# Patient Record
Sex: FEMALE | Race: WHITE | NOT HISPANIC OR LATINO
[De-identification: names, ages, dates, MRNs, and addresses within clinical notes are randomized per-mention and may not be internally consistent; named-entity substitution may affect disease eponyms.]

---

## 2017-08-14 PROBLEM — Z00.00 ENCOUNTER FOR PREVENTIVE HEALTH EXAMINATION: Status: ACTIVE | Noted: 2017-08-14

## 2017-08-15 ENCOUNTER — APPOINTMENT (OUTPATIENT)
Dept: INTERNAL MEDICINE | Facility: CLINIC | Age: 57
End: 2017-08-15
Payer: COMMERCIAL

## 2017-08-15 ENCOUNTER — NON-APPOINTMENT (OUTPATIENT)
Age: 57
End: 2017-08-15

## 2017-08-15 VITALS
HEIGHT: 67 IN | HEART RATE: 93 BPM | DIASTOLIC BLOOD PRESSURE: 89 MMHG | WEIGHT: 170 LBS | TEMPERATURE: 97.8 F | RESPIRATION RATE: 17 BRPM | SYSTOLIC BLOOD PRESSURE: 136 MMHG | BODY MASS INDEX: 26.68 KG/M2 | OXYGEN SATURATION: 98 %

## 2017-08-15 DIAGNOSIS — Z86.69 PERSONAL HISTORY OF OTHER DISEASES OF THE NERVOUS SYSTEM AND SENSE ORGANS: ICD-10-CM

## 2017-08-15 DIAGNOSIS — Z82.0 FAMILY HISTORY OF EPILEPSY AND OTHER DISEASES OF THE NERVOUS SYSTEM: ICD-10-CM

## 2017-08-15 DIAGNOSIS — I10 ESSENTIAL (PRIMARY) HYPERTENSION: ICD-10-CM

## 2017-08-15 DIAGNOSIS — Z87.898 PERSONAL HISTORY OF OTHER SPECIFIED CONDITIONS: ICD-10-CM

## 2017-08-15 DIAGNOSIS — Z82.49 FAMILY HISTORY OF ISCHEMIC HEART DISEASE AND OTHER DISEASES OF THE CIRCULATORY SYSTEM: ICD-10-CM

## 2017-08-15 DIAGNOSIS — N60.09 SOLITARY CYST OF UNSPECIFIED BREAST: ICD-10-CM

## 2017-08-15 DIAGNOSIS — Z78.9 OTHER SPECIFIED HEALTH STATUS: ICD-10-CM

## 2017-08-15 DIAGNOSIS — Z80.51 FAMILY HISTORY OF MALIGNANT NEOPLASM OF KIDNEY: ICD-10-CM

## 2017-08-15 DIAGNOSIS — Z86.59 PERSONAL HISTORY OF OTHER MENTAL AND BEHAVIORAL DISORDERS: ICD-10-CM

## 2017-08-15 DIAGNOSIS — R00.2 PALPITATIONS: ICD-10-CM

## 2017-08-15 DIAGNOSIS — Z80.1 FAMILY HISTORY OF MALIGNANT NEOPLASM OF TRACHEA, BRONCHUS AND LUNG: ICD-10-CM

## 2017-08-15 PROCEDURE — 99204 OFFICE O/P NEW MOD 45 MIN: CPT | Mod: 25

## 2017-08-15 PROCEDURE — 93000 ELECTROCARDIOGRAM COMPLETE: CPT

## 2017-08-15 RX ORDER — TRAZODONE HYDROCHLORIDE 50 MG/1
50 TABLET ORAL
Qty: 60 | Refills: 0 | Status: ACTIVE | COMMUNITY
Start: 2017-02-28

## 2017-08-15 RX ORDER — DULOXETINE HYDROCHLORIDE 60 MG/1
60 CAPSULE, DELAYED RELEASE PELLETS ORAL
Qty: 60 | Refills: 0 | Status: COMPLETED | COMMUNITY
Start: 2017-04-12

## 2017-08-15 RX ORDER — ESZOPICLONE 1 MG/1
1 TABLET, FILM COATED ORAL
Qty: 30 | Refills: 0 | Status: COMPLETED | COMMUNITY
Start: 2017-06-01

## 2017-08-16 LAB
ALBUMIN SERPL ELPH-MCNC: 4.7 G/DL
ALP BLD-CCNC: 96 U/L
ALT SERPL-CCNC: 36 U/L
ANION GAP SERPL CALC-SCNC: 17 MMOL/L
AST SERPL-CCNC: 34 U/L
BASOPHILS # BLD AUTO: 0.03 K/UL
BASOPHILS NFR BLD AUTO: 0.3 %
BILIRUB SERPL-MCNC: 0.4 MG/DL
BUN SERPL-MCNC: 14 MG/DL
CALCIUM SERPL-MCNC: 9.6 MG/DL
CHLORIDE SERPL-SCNC: 98 MMOL/L
CHOLEST SERPL-MCNC: 201 MG/DL
CHOLEST/HDLC SERPL: 3.2 RATIO
CO2 SERPL-SCNC: 26 MMOL/L
CREAT SERPL-MCNC: 0.83 MG/DL
EOSINOPHIL # BLD AUTO: 0.48 K/UL
EOSINOPHIL NFR BLD AUTO: 5.2 %
GLUCOSE SERPL-MCNC: 87 MG/DL
HBA1C MFR BLD HPLC: 5.9 %
HCT VFR BLD CALC: 42.7 %
HDLC SERPL-MCNC: 62 MG/DL
HGB BLD-MCNC: 13.7 G/DL
IMM GRANULOCYTES NFR BLD AUTO: 0.4 %
LDLC SERPL CALC-MCNC: 101 MG/DL
LYMPHOCYTES # BLD AUTO: 2.62 K/UL
LYMPHOCYTES NFR BLD AUTO: 28.5 %
MAN DIFF?: NORMAL
MCHC RBC-ENTMCNC: 32.1 GM/DL
MCHC RBC-ENTMCNC: 32.9 PG
MCV RBC AUTO: 102.4 FL
MONOCYTES # BLD AUTO: 0.63 K/UL
MONOCYTES NFR BLD AUTO: 6.9 %
NEUTROPHILS # BLD AUTO: 5.38 K/UL
NEUTROPHILS NFR BLD AUTO: 58.7 %
PLATELET # BLD AUTO: 347 K/UL
POTASSIUM SERPL-SCNC: 4.6 MMOL/L
PROT SERPL-MCNC: 7.5 G/DL
RBC # BLD: 4.17 M/UL
RBC # FLD: 13.5 %
SODIUM SERPL-SCNC: 141 MMOL/L
T4 FREE SERPL-MCNC: 1.1 NG/DL
TRIGL SERPL-MCNC: 191 MG/DL
TSH SERPL-ACNC: 2.89 UIU/ML
WBC # FLD AUTO: 9.18 K/UL

## 2017-09-27 ENCOUNTER — APPOINTMENT (OUTPATIENT)
Dept: CARDIOLOGY | Facility: CLINIC | Age: 57
End: 2017-09-27

## 2021-11-08 ENCOUNTER — APPOINTMENT (OUTPATIENT)
Dept: UROLOGY | Facility: CLINIC | Age: 61
End: 2021-11-08
Payer: COMMERCIAL

## 2021-11-08 VITALS
OXYGEN SATURATION: 96 % | WEIGHT: 170 LBS | BODY MASS INDEX: 26.37 KG/M2 | DIASTOLIC BLOOD PRESSURE: 82 MMHG | HEART RATE: 88 BPM | HEIGHT: 67.5 IN | TEMPERATURE: 97.3 F | RESPIRATION RATE: 17 BRPM | SYSTOLIC BLOOD PRESSURE: 128 MMHG

## 2021-11-08 DIAGNOSIS — R39.15 URGENCY OF URINATION: ICD-10-CM

## 2021-11-08 DIAGNOSIS — R35.1 NOCTURIA: ICD-10-CM

## 2021-11-08 DIAGNOSIS — R35.0 FREQUENCY OF MICTURITION: ICD-10-CM

## 2021-11-08 DIAGNOSIS — Z86.59 PERSONAL HISTORY OF OTHER MENTAL AND BEHAVIORAL DISORDERS: ICD-10-CM

## 2021-11-08 DIAGNOSIS — Z86.79 PERSONAL HISTORY OF OTHER DISEASES OF THE CIRCULATORY SYSTEM: ICD-10-CM

## 2021-11-08 DIAGNOSIS — R10.9 UNSPECIFIED ABDOMINAL PAIN: ICD-10-CM

## 2021-11-08 PROCEDURE — 99204 OFFICE O/P NEW MOD 45 MIN: CPT | Mod: 25

## 2021-11-08 PROCEDURE — 51798 US URINE CAPACITY MEASURE: CPT

## 2021-11-08 NOTE — HISTORY OF PRESENT ILLNESS
[FreeTextEntry1] : seen by PCP and labs done that showed elevated potassium and with patient hx of flank pain and fam hx of renal disease was told to see Urology\par no hematuria\par no tobacco use or exposure\par no renal stone hx or fam hx of the same\par + MOHAN after  ( vag) with use of liners\par not sexually active\par admits to  ' a lot of water intake' and tomato products ( I'm Iital[place]) but denies any other spicy foods or fluids \par no bowel issues\par here for further eval :

## 2021-11-08 NOTE — PHYSICAL EXAM
[General Appearance - Well Developed] : well developed [General Appearance - Well Nourished] : well nourished [Normal Appearance] : normal appearance [Well Groomed] : well groomed [General Appearance - In No Acute Distress] : no acute distress [Edema] : no peripheral edema [Respiration, Rhythm And Depth] : normal respiratory rhythm and effort [Exaggerated Use Of Accessory Muscles For Inspiration] : no accessory muscle use [Abdomen Soft] : soft [Abdomen Tenderness] : non-tender [Abdomen Mass (___ Cm)] : no abdominal mass palpated [Abdomen Hernia] : no hernia was discovered [Costovertebral Angle Tenderness] : no ~M costovertebral angle tenderness [FreeTextEntry1] : pvr 44 ml  [Normal Station and Gait] : the gait and station were normal for the patient's age [] : no rash [No Focal Deficits] : no focal deficits [Oriented To Time, Place, And Person] : oriented to person, place, and time [Affect] : the affect was normal [Mood] : the mood was normal [Not Anxious] : not anxious [Cervical Lymph Nodes Enlarged Posterior Bilaterally] : posterior cervical [Cervical Lymph Nodes Enlarged Anterior Bilaterally] : anterior cervical [Supraclavicular Lymph Nodes Enlarged Bilaterally] : supraclavicular

## 2021-11-08 NOTE — ASSESSMENT
[FreeTextEntry1] : patien with many years of frequency,nocturia and urgency and INC\par seen by PCP and labs done that showed elevated potassium and with patient hx of flank pain and fam hx of renal disease was told to see Urology\par no hematuria\par no tobacco use or exposure\par no renal stone hx or fam hx of the same\par + MOHAN after  ( vag) with use of liners\par not sexually active\par admits to  ' a lot of water intake' and tomato products ( I'm [place]) but denies any other spicy foods or fluids \par no bowel issues\par here for further eval :\par recommend\par 1) chec urine\par 2) trial of meds for ? OAB sxs : \par 3) discussed first line of therapy with behaviro modification and list of acidic foods to decrease given\par 4) discussed if 3rd  line of therapy with BOtox or  PTNS\par for now she agrees to meds: vesicare 10 mg daily \par risks and benefits discussed\par will get screening LYDIA due to flank pain and concern for renal disease --but stressed to see NEPHROLOGY for elevated potassium\par rtc 4-6 weeks to see if meds helping \par amparo call with urine results

## 2021-11-08 NOTE — REVIEW OF SYSTEMS
[Shortness Of Breath] : shortness of breath [Wheezing] : wheezing [Heartburn] : heartburn [Date of last menstrual period ____] : date of last menstrual period: [unfilled] [Wake up at night to urinate  How many times?  ___] : wakes up to urinate [unfilled] times during the night [Itching] : itching [Anxiety] : anxiety [Depression] : depression [Negative] : Heme/Lymph [Feeling Tired] : feeling tired [see HPI] : see HPI [Urine Infection (bladder/kidney)] : bladder/kidney infection [Pain during urination] : pain during urination [Leakage of urine with straining, coughing, laughing] : leakage of urine with straining, coughing, laughing [FreeTextEntry2] : HBP [FreeTextEntry6] : day frequency - 10

## 2021-11-10 LAB
APPEARANCE: ABNORMAL
BACTERIA UR CULT: NORMAL
BACTERIA: NEGATIVE
BILIRUBIN URINE: NEGATIVE
BLOOD URINE: NEGATIVE
CALCIUM OXALATE CRYSTALS: ABNORMAL
COLOR: YELLOW
GLUCOSE QUALITATIVE U: NEGATIVE
HYALINE CASTS: 0 /LPF
KETONES URINE: NEGATIVE
LEUKOCYTE ESTERASE URINE: ABNORMAL
MICROSCOPIC-UA: NORMAL
NITRITE URINE: NEGATIVE
PH URINE: 6
PROTEIN URINE: NORMAL
RED BLOOD CELLS URINE: 2 /HPF
SPECIFIC GRAVITY URINE: 1.02
SQUAMOUS EPITHELIAL CELLS: 7 /HPF
UROBILINOGEN URINE: NORMAL
WHITE BLOOD CELLS URINE: 21 /HPF

## 2021-11-15 ENCOUNTER — APPOINTMENT (OUTPATIENT)
Dept: ULTRASOUND IMAGING | Facility: CLINIC | Age: 61
End: 2021-11-15
Payer: COMMERCIAL

## 2021-11-15 PROCEDURE — 76775 US EXAM ABDO BACK WALL LIM: CPT

## 2021-12-06 ENCOUNTER — APPOINTMENT (OUTPATIENT)
Dept: UROLOGY | Facility: CLINIC | Age: 61
End: 2021-12-06

## 2022-03-11 ENCOUNTER — APPOINTMENT (OUTPATIENT)
Dept: PULMONOLOGY | Facility: CLINIC | Age: 62
End: 2022-03-11

## 2022-03-16 ENCOUNTER — RX RENEWAL (OUTPATIENT)
Age: 62
End: 2022-03-16

## 2022-03-26 ENCOUNTER — TRANSCRIPTION ENCOUNTER (OUTPATIENT)
Age: 62
End: 2022-03-26

## 2022-04-06 ENCOUNTER — TRANSCRIPTION ENCOUNTER (OUTPATIENT)
Age: 62
End: 2022-04-06

## 2023-02-04 ENCOUNTER — NON-APPOINTMENT (OUTPATIENT)
Age: 63
End: 2023-02-04

## 2023-06-14 ENCOUNTER — INPATIENT (INPATIENT)
Facility: HOSPITAL | Age: 63
LOS: 0 days | Discharge: HOME | DRG: 378 | End: 2023-06-15
Attending: HOSPITALIST | Admitting: STUDENT IN AN ORGANIZED HEALTH CARE EDUCATION/TRAINING PROGRAM
Payer: COMMERCIAL

## 2023-06-14 VITALS
WEIGHT: 166.01 LBS | OXYGEN SATURATION: 100 % | RESPIRATION RATE: 20 BRPM | DIASTOLIC BLOOD PRESSURE: 81 MMHG | SYSTOLIC BLOOD PRESSURE: 128 MMHG | HEIGHT: 68 IN | TEMPERATURE: 99 F | HEART RATE: 93 BPM

## 2023-06-14 DIAGNOSIS — I10 ESSENTIAL (PRIMARY) HYPERTENSION: ICD-10-CM

## 2023-06-14 DIAGNOSIS — K92.2 GASTROINTESTINAL HEMORRHAGE, UNSPECIFIED: ICD-10-CM

## 2023-06-14 DIAGNOSIS — Z29.9 ENCOUNTER FOR PROPHYLACTIC MEASURES, UNSPECIFIED: ICD-10-CM

## 2023-06-14 DIAGNOSIS — D64.9 ANEMIA, UNSPECIFIED: ICD-10-CM

## 2023-06-14 DIAGNOSIS — Z90.49 ACQUIRED ABSENCE OF OTHER SPECIFIED PARTS OF DIGESTIVE TRACT: Chronic | ICD-10-CM

## 2023-06-14 DIAGNOSIS — F41.9 ANXIETY DISORDER, UNSPECIFIED: ICD-10-CM

## 2023-06-14 LAB
ALBUMIN SERPL ELPH-MCNC: 4.4 G/DL — SIGNIFICANT CHANGE UP (ref 3.3–5)
ALP SERPL-CCNC: 69 U/L — SIGNIFICANT CHANGE UP (ref 40–120)
ALT FLD-CCNC: 21 U/L — SIGNIFICANT CHANGE UP (ref 10–45)
ANION GAP SERPL CALC-SCNC: 11 MMOL/L — SIGNIFICANT CHANGE UP (ref 5–17)
ANISOCYTOSIS BLD QL: SIGNIFICANT CHANGE UP
APTT BLD: 28 SEC — SIGNIFICANT CHANGE UP (ref 27.5–35.5)
AST SERPL-CCNC: 28 U/L — SIGNIFICANT CHANGE UP (ref 10–40)
BASOPHILS # BLD AUTO: 0 K/UL — SIGNIFICANT CHANGE UP (ref 0–0.2)
BASOPHILS NFR BLD AUTO: 0 % — SIGNIFICANT CHANGE UP (ref 0–2)
BILIRUB SERPL-MCNC: 0.2 MG/DL — SIGNIFICANT CHANGE UP (ref 0.2–1.2)
BUN SERPL-MCNC: 17 MG/DL — SIGNIFICANT CHANGE UP (ref 7–23)
CALCIUM SERPL-MCNC: 9.2 MG/DL — SIGNIFICANT CHANGE UP (ref 8.4–10.5)
CHLORIDE SERPL-SCNC: 102 MMOL/L — SIGNIFICANT CHANGE UP (ref 96–108)
CO2 SERPL-SCNC: 25 MMOL/L — SIGNIFICANT CHANGE UP (ref 22–31)
CREAT SERPL-MCNC: 0.73 MG/DL — SIGNIFICANT CHANGE UP (ref 0.5–1.3)
EGFR: 91 ML/MIN/1.73M2 — SIGNIFICANT CHANGE UP
EOSINOPHIL # BLD AUTO: 0.21 K/UL — SIGNIFICANT CHANGE UP (ref 0–0.5)
EOSINOPHIL NFR BLD AUTO: 1.7 % — SIGNIFICANT CHANGE UP (ref 0–6)
GLUCOSE SERPL-MCNC: 116 MG/DL — HIGH (ref 70–99)
HCT VFR BLD CALC: 19.5 % — CRITICAL LOW (ref 34.5–45)
HGB BLD-MCNC: 6.4 G/DL — CRITICAL LOW (ref 11.5–15.5)
INR BLD: 0.96 RATIO — SIGNIFICANT CHANGE UP (ref 0.88–1.16)
LYMPHOCYTES # BLD AUTO: 2.56 K/UL — SIGNIFICANT CHANGE UP (ref 1–3.3)
LYMPHOCYTES # BLD AUTO: 21.2 % — SIGNIFICANT CHANGE UP (ref 13–44)
MACROCYTES BLD QL: SLIGHT — SIGNIFICANT CHANGE UP
MANUAL SMEAR VERIFICATION: SIGNIFICANT CHANGE UP
MCHC RBC-ENTMCNC: 32.8 GM/DL — SIGNIFICANT CHANGE UP (ref 32–36)
MCHC RBC-ENTMCNC: 35 PG — HIGH (ref 27–34)
MCV RBC AUTO: 106.6 FL — HIGH (ref 80–100)
MONOCYTES # BLD AUTO: 0.92 K/UL — HIGH (ref 0–0.9)
MONOCYTES NFR BLD AUTO: 7.6 % — SIGNIFICANT CHANGE UP (ref 2–14)
MYELOCYTES NFR BLD: 1.7 % — HIGH (ref 0–0)
NEUTROPHILS # BLD AUTO: 8.18 K/UL — HIGH (ref 1.8–7.4)
NEUTROPHILS NFR BLD AUTO: 67.8 % — SIGNIFICANT CHANGE UP (ref 43–77)
NRBC # BLD: 1 /100 — HIGH (ref 0–0)
OB PNL STL: POSITIVE
PLAT MORPH BLD: NORMAL — SIGNIFICANT CHANGE UP
PLATELET # BLD AUTO: 352 K/UL — SIGNIFICANT CHANGE UP (ref 150–400)
POLYCHROMASIA BLD QL SMEAR: SLIGHT — SIGNIFICANT CHANGE UP
POTASSIUM SERPL-MCNC: 3.3 MMOL/L — LOW (ref 3.5–5.3)
POTASSIUM SERPL-SCNC: 3.3 MMOL/L — LOW (ref 3.5–5.3)
PROT SERPL-MCNC: 6.5 G/DL — SIGNIFICANT CHANGE UP (ref 6–8.3)
PROTHROM AB SERPL-ACNC: 11 SEC — SIGNIFICANT CHANGE UP (ref 10.5–13.4)
RBC # BLD: 1.83 M/UL — LOW (ref 3.8–5.2)
RBC # FLD: 14.1 % — SIGNIFICANT CHANGE UP (ref 10.3–14.5)
RBC BLD AUTO: ABNORMAL
SODIUM SERPL-SCNC: 138 MMOL/L — SIGNIFICANT CHANGE UP (ref 135–145)
WBC # BLD: 12.07 K/UL — HIGH (ref 3.8–10.5)
WBC # FLD AUTO: 12.07 K/UL — HIGH (ref 3.8–10.5)

## 2023-06-14 PROCEDURE — 71045 X-RAY EXAM CHEST 1 VIEW: CPT | Mod: 26

## 2023-06-14 PROCEDURE — 99223 1ST HOSP IP/OBS HIGH 75: CPT | Mod: GC

## 2023-06-14 PROCEDURE — 99285 EMERGENCY DEPT VISIT HI MDM: CPT | Mod: FS

## 2023-06-14 RX ORDER — FUROSEMIDE 40 MG
40 TABLET ORAL EVERY 12 HOURS
Refills: 0 | Status: DISCONTINUED | OUTPATIENT
Start: 2023-06-15 | End: 2023-06-15

## 2023-06-14 RX ORDER — POTASSIUM CHLORIDE 20 MEQ
40 PACKET (EA) ORAL ONCE
Refills: 0 | Status: COMPLETED | OUTPATIENT
Start: 2023-06-14 | End: 2023-06-14

## 2023-06-14 RX ORDER — ACETAMINOPHEN 500 MG
650 TABLET ORAL EVERY 6 HOURS
Refills: 0 | Status: DISCONTINUED | OUTPATIENT
Start: 2023-06-14 | End: 2023-06-15

## 2023-06-14 RX ORDER — PANTOPRAZOLE SODIUM 20 MG/1
80 TABLET, DELAYED RELEASE ORAL ONCE
Refills: 0 | Status: COMPLETED | OUTPATIENT
Start: 2023-06-14 | End: 2023-06-14

## 2023-06-14 RX ORDER — TRAZODONE HCL 50 MG
50 TABLET ORAL AT BEDTIME
Refills: 0 | Status: DISCONTINUED | OUTPATIENT
Start: 2023-06-14 | End: 2023-06-15

## 2023-06-14 RX ORDER — DULOXETINE HYDROCHLORIDE 30 MG/1
60 CAPSULE, DELAYED RELEASE ORAL DAILY
Refills: 0 | Status: DISCONTINUED | OUTPATIENT
Start: 2023-06-14 | End: 2023-06-15

## 2023-06-14 RX ADMIN — PANTOPRAZOLE SODIUM 80 MILLIGRAM(S): 20 TABLET, DELAYED RELEASE ORAL at 21:01

## 2023-06-14 RX ADMIN — Medication 40 MILLIEQUIVALENT(S): at 22:56

## 2023-06-14 NOTE — ED ADULT NURSE NOTE - NSFALLUNIVINTERV_ED_ALL_ED
Bed/Stretcher in lowest position, wheels locked, appropriate side rails in place/Call bell, personal items and telephone in reach/Instruct patient to call for assistance before getting out of bed/chair/stretcher/Non-slip footwear applied when patient is off stretcher/[place] to call system/Physically safe environment - no spills, clutter or unnecessary equipment/Purposeful proactive rounding/Room/bathroom lighting operational, light cord in reach

## 2023-06-14 NOTE — H&P ADULT - NSHPLABSRESULTS_GEN_ALL_CORE
(06-14 @ 19:47)                      6.4  12.07 )-----------( 352                 19.5    Neutrophils = 8.18 (67.8%)  Lymphocytes = 2.56 (21.2%)  Eosinophils = 0.21 (1.7%)  Basophils = 0.00 (0.0%)  Monocytes = 0.92 (7.6%)  Bands = --%    06-14    138  |  102  |  17  ----------------------------<  116<H>  3.3<L>   |  25  |  0.73    Ca    9.2      14 Jun 2023 19:47    TPro  6.5  /  Alb  4.4  /  TBili  0.2  /  DBili  x   /  AST  28  /  ALT  21  /  AlkPhos  69  06-14    ( 14 Jun 2023 19:47 )   PT: 11.0 sec;   INR: 0.96 ratio;       PTT:28.0 sec      RVP:          Tox: (06-14 @ 19:47)                      6.4  12.07 )-----------( 352                 19.5    Neutrophils = 8.18 (67.8%)  Lymphocytes = 2.56 (21.2%)  Eosinophils = 0.21 (1.7%)  Basophils = 0.00 (0.0%)  Monocytes = 0.92 (7.6%)  Bands = --%    06-14    138  |  102  |  17  ----------------------------<  116<H>  3.3<L>   |  25  |  0.73    Ca    9.2      14 Jun 2023 19:47    TPro  6.5  /  Alb  4.4  /  TBili  0.2  /  DBili  x   /  AST  28  /  ALT  21  /  AlkPhos  69  06-14    ( 14 Jun 2023 19:47 )   PT: 11.0 sec;   INR: 0.96 ratio;    PTT:28.0 sec    RVP:    Tox:    I have reviewed the labs, imaging and ekg.

## 2023-06-14 NOTE — ED ADULT NURSE NOTE - OBJECTIVE STATEMENT
63year old female pt presented to the ED stating low H/H, pt was dizzy and lightheaded dismissed symptoms due to recent medication change, followed up with Cardiologist who did US/Echo and bloodwork, received a call for low h/h, pt denies any anticoagulant use states 1 dark stook on sat, denies hematuria, pt denies any chest pain no shortness of breath pt is ambulatory A&Ox3 abd soft non tender non distended lung field cta

## 2023-06-14 NOTE — H&P ADULT - TIME BILLING
Need to interview and examine patient, discuss case with house staff and RNs, provide counseling, and review prior medical records

## 2023-06-14 NOTE — ED ADULT NURSE REASSESSMENT NOTE - NS ED NURSE REASSESS COMMENT FT1
Received report from KRISTINA Caputo RN. Patient presenting to the ED following low hemglobin of 6.4 at MD office. Patient notes no dizziness while at rest. Patient reports feeling lightheaded with ambulation. Assisted to restroom. AOx4 and speaking coherently. Steady gait. Breathing is unlabored, spontaneous, and symmetrical. Comfort and safety measures maintained.

## 2023-06-14 NOTE — ED PROVIDER NOTE - PHYSICAL EXAMINATION
GEN: Pt non-toxic in NAD, alert.  PSYCH: Affect and mood appropriate.  EYES: Sclera pale w/o injection, EOMI, PERRLA.   ENT: Neck supple. Airway patent.  RESP: CTA b/l, no wheezes, rales, or rhonchi.   CARDIAC: RRR, clear distinct S1, S2, no appreciable murmurs.  ABD: Soft, non-tender. No CVAT b/l.  MSK: ROBERT. FROM throughout.  NEURO: No focal motor or sensory deficits.  VASC: Strong distal pulses. No edema. No calf tenderness.  SKIN: No rashes or lesions.

## 2023-06-14 NOTE — H&P ADULT - PROBLEM SELECTOR PLAN 1
likely 2/2 UGIB. On admission, Hg 6.5. Pt with dizziness, palpitations, aspirin use > 20 years    - active type and screen  - transfuse if Hg <7  - 1u prbc in ED  - c/w Protonix 40 IVP BID likely 2/2 UGIB. On admission, Hg 6.5. Pt with dizziness, palpitations, aspirin use > 20 years    - active type and screen  - transfuse if Hg <7  - 1u prbc in ED  - c/w Protonix 40 IVP BID  - GI emailed  - iron labs, folate, B12 pending likely 2/2 UGIB. On admission, Hg 6.5. Pt with dizziness, palpitations, aspirin use > 20 years    - active type and screen  - transfuse if Hg <7  - 1u prbc in ED  - c/w Protonix 40 IVP BID  - GI emailed  - iron labs, folate, B12 pending  -Trend cbc  -Clear liquid diet

## 2023-06-14 NOTE — H&P ADULT - PROBLEM SELECTOR PLAN 2
- currently off meds; previously on lisinopril which was switched amlodipine however developed LE swelling     - monitor BP - currently off meds; previously on lisinopril which was switched amlodipine however developed LE swelling   - monitor BP

## 2023-06-14 NOTE — H&P ADULT - NSHPPHYSICALEXAM_GEN_ALL_CORE
Vital Signs Last 24 Hrs  T(C): 36.6 (14 Jun 2023 21:45), Max: 37 (14 Jun 2023 15:11)  T(F): 97.8 (14 Jun 2023 21:45), Max: 98.6 (14 Jun 2023 15:11)  HR: 83 (14 Jun 2023 21:45) (69 - 95)  BP: 118/74 (14 Jun 2023 21:45) (118/74 - 134/82)  BP(mean): --  RR: 18 (14 Jun 2023 21:45) (16 - 20)  SpO2: 98% (14 Jun 2023 21:45) (96% - 100%)    Parameters below as of 14 Jun 2023 21:45  Patient On (Oxygen Delivery Method): room air        CONSTITUTIONAL: Well-groomed, in no apparent distress  EYES: No conjunctival or scleral injection, non-icteric; PERRLA and symmetric  ENMT: No external nasal lesions; oral mucosa with moist membranes  NECK: Trachea midline  RESPIRATORY: Breathing comfortably; lungs CTA without wheeze/rhonchi/rales  CARDIOVASCULAR: +S1S2, RRR, no M/G/R;; no lower extremity edema  GASTROINTESTINAL: nondistended, tender to palpation in epigastric region; bowel sounds present; no rebound/guarding   MUSCULOSKELETAL: normal strength and tone of extremities  SKIN: No rashes or ulcers noted  NEUROLOGIC: no focal deficits noted   PSYCHIATRIC: A+O x 3; mood and affect appropriate; appropriate insight and judgment

## 2023-06-14 NOTE — ED PROVIDER NOTE - NS ED ATTENDING STATEMENT MOD
This was a shared visit with the JONA. I reviewed and verified the documentation and independently performed the documented:

## 2023-06-14 NOTE — H&P ADULT - ASSESSMENT
63F with HTN, asthma, migraines p/w abnormal outpatient labs from cardiologists office.  63F with HTN, asthma, migraines p/w abnormal outpatient labs from cardiologists office. Admitted for anemia likely 2/2 UGIB.

## 2023-06-14 NOTE — H&P ADULT - ATTENDING COMMENTS
63F with HTN, asthma, migraines p/w abnormal outpatient labs from cardiologists office. Admitted for anemia likely 2/2 UGIB. Placed on zio patch recently for pt's SOB. Also with hypokalemia  -Clear liquid diet  -PPI IV BID  -Transfuse for hgb <7  -GI consult in AM  -Cont. home psych meds  -Replete electrolyte K<4, Mg <2  -DVT PPX, SCDs

## 2023-06-14 NOTE — ED PROVIDER NOTE - CLINICAL SUMMARY MEDICAL DECISION MAKING FREE TEXT BOX
Tyler Boss MD, FACEP: In this physician's medical judgement based on clinical history and physical exam the patient's signs and symptoms lead to differential diagnoses which includes but is not limited to: symptomatic anemia, Upper GIB    Historical features, symptoms, and clinical exam not consistent with: lower gib    Labs were ordered and independently reviewed by me.  EKG was ordered and independently reviewed by me.  Imaging was ordered and reviewed by me.      Appropriate medications for the patient's presenting complaints were ordered, and effects were reassessed.   Escalation to admission/observation was considered.    Will follow up on labs, therapeutics, imaging, reassess and disposition as clinically indicated.  *The above represents an initial assessment/impression. Please refer to my progress notes below for potential changes in patient clinical course*

## 2023-06-14 NOTE — H&P ADULT - PROBLEM SELECTOR PLAN 3
Home meds: Trazadone QHS, cymbalta 60 daily     - c/w home meds Home meds: Trazadone QHS, cymbalta 60 daily   - c/w home meds

## 2023-06-14 NOTE — ED PROVIDER NOTE - OBJECTIVE STATEMENT
63-year-old female past medical history of hypertension presents to ED accompanied by  and referred by cardiologist for anemia hemoglobin 7.1 on outpatient labs done 2 days ago.  Patient reports she was having dizziness and palpitations on exertion over the weekend.  Saw her cardiologist on Monday had labs done and echo performed yesterday with Holter monitor placed.  Not on AC.  Reports 1 dark bowel movement over the weekend but none since.  No history of GI bleeds, no heavy vaginal bleeding, no hematuria.  Denies fever, chills, chest pain, shortness of breath, abdominal pain.

## 2023-06-14 NOTE — ED PROVIDER NOTE - PROGRESS NOTE DETAILS
Lupillo Josue PA-C: Hemoglobin 6.4, fecal occult positive.  Consent for blood signed.  Will transfuse and admit.

## 2023-06-14 NOTE — H&P ADULT - HISTORY OF PRESENT ILLNESS
63F with HTN, asthma, migraines p/w abnormal outpatient labs from cardiologists office.  2019 novel coronavirus disease (COVID-19) Aspiration pneumonitis 63F with HTN, asthma, migraines p/w abnormal outpatient labs from cardiologists office. Hgb noted to be 7.1 (baseline ~13 on HIE). Pt reports dizziness and palpitations over the weekend. Her HR at that time 110- 130s with stable BP. She had gone to the cardiologist (Dr. Tyler Metz) on Monday who had obtained an EKG and blood work. TTE was completed yesterday per patient. States she had one episode of melena on Saturday, after which she has not had any further bowel movements. Today, denies dizziness. Reports palpitations and fatigue. Denies N/V/F/C, chest pain, SOB, dysuria. Reports feeling of fullness/ distension and mild epigastric pain. She has never been on AC. Reports taking 2 tablets of Excedrin ~3x/ week for 25 years. Denies increased alochol use, spicy foods, and coffee intake. Pt denies vaginal bleeding or history of fibroids. Menopause 20 years ago.     In the ED- HR 60- 90, - 130, room air  Labs- Hgb 6.4, WBC 12.07, Plt 352, K 3.3, Cr 0.73, fecal occult +  CXR clear lungs  Meds- protonix 80, 1u PRBC

## 2023-06-15 ENCOUNTER — TRANSCRIPTION ENCOUNTER (OUTPATIENT)
Age: 63
End: 2023-06-15

## 2023-06-15 ENCOUNTER — RESULT REVIEW (OUTPATIENT)
Age: 63
End: 2023-06-15

## 2023-06-15 VITALS
RESPIRATION RATE: 20 BRPM | HEART RATE: 100 BPM | OXYGEN SATURATION: 100 % | DIASTOLIC BLOOD PRESSURE: 66 MMHG | SYSTOLIC BLOOD PRESSURE: 122 MMHG

## 2023-06-15 LAB
ALBUMIN SERPL ELPH-MCNC: 4 G/DL — SIGNIFICANT CHANGE UP (ref 3.3–5)
ALP SERPL-CCNC: 64 U/L — SIGNIFICANT CHANGE UP (ref 40–120)
ALT FLD-CCNC: 25 U/L — SIGNIFICANT CHANGE UP (ref 10–45)
ANION GAP SERPL CALC-SCNC: 10 MMOL/L — SIGNIFICANT CHANGE UP (ref 5–17)
APTT BLD: 26.4 SEC — LOW (ref 27.5–35.5)
AST SERPL-CCNC: 31 U/L — SIGNIFICANT CHANGE UP (ref 10–40)
BASOPHILS # BLD AUTO: 0.05 K/UL — SIGNIFICANT CHANGE UP (ref 0–0.2)
BASOPHILS NFR BLD AUTO: 0.5 % — SIGNIFICANT CHANGE UP (ref 0–2)
BILIRUB SERPL-MCNC: 0.3 MG/DL — SIGNIFICANT CHANGE UP (ref 0.2–1.2)
BUN SERPL-MCNC: 11 MG/DL — SIGNIFICANT CHANGE UP (ref 7–23)
CALCIUM SERPL-MCNC: 9.1 MG/DL — SIGNIFICANT CHANGE UP (ref 8.4–10.5)
CHLORIDE SERPL-SCNC: 104 MMOL/L — SIGNIFICANT CHANGE UP (ref 96–108)
CO2 SERPL-SCNC: 26 MMOL/L — SIGNIFICANT CHANGE UP (ref 22–31)
CREAT SERPL-MCNC: 0.71 MG/DL — SIGNIFICANT CHANGE UP (ref 0.5–1.3)
EGFR: 94 ML/MIN/1.73M2 — SIGNIFICANT CHANGE UP
EOSINOPHIL # BLD AUTO: 0.34 K/UL — SIGNIFICANT CHANGE UP (ref 0–0.5)
EOSINOPHIL NFR BLD AUTO: 3.4 % — SIGNIFICANT CHANGE UP (ref 0–6)
FERRITIN SERPL-MCNC: 69 NG/ML — SIGNIFICANT CHANGE UP (ref 15–150)
FOLATE SERPL-MCNC: >20 NG/ML — SIGNIFICANT CHANGE UP
GLUCOSE SERPL-MCNC: 103 MG/DL — HIGH (ref 70–99)
HCT VFR BLD CALC: 23.4 % — LOW (ref 34.5–45)
HCT VFR BLD CALC: 25 % — LOW (ref 34.5–45)
HGB BLD-MCNC: 7.5 G/DL — LOW (ref 11.5–15.5)
HGB BLD-MCNC: 7.9 G/DL — LOW (ref 11.5–15.5)
IMM GRANULOCYTES NFR BLD AUTO: 1.2 % — HIGH (ref 0–0.9)
INR BLD: 0.93 RATIO — SIGNIFICANT CHANGE UP (ref 0.88–1.16)
IRON SATN MFR SERPL: 12 % — LOW (ref 14–50)
IRON SATN MFR SERPL: 46 UG/DL — SIGNIFICANT CHANGE UP (ref 30–160)
LYMPHOCYTES # BLD AUTO: 2.56 K/UL — SIGNIFICANT CHANGE UP (ref 1–3.3)
LYMPHOCYTES # BLD AUTO: 25.8 % — SIGNIFICANT CHANGE UP (ref 13–44)
MAGNESIUM SERPL-MCNC: 2.3 MG/DL — SIGNIFICANT CHANGE UP (ref 1.6–2.6)
MCHC RBC-ENTMCNC: 31.6 GM/DL — LOW (ref 32–36)
MCHC RBC-ENTMCNC: 31.6 PG — SIGNIFICANT CHANGE UP (ref 27–34)
MCHC RBC-ENTMCNC: 32.1 GM/DL — SIGNIFICANT CHANGE UP (ref 32–36)
MCHC RBC-ENTMCNC: 32.3 PG — SIGNIFICANT CHANGE UP (ref 27–34)
MCV RBC AUTO: 100 FL — SIGNIFICANT CHANGE UP (ref 80–100)
MCV RBC AUTO: 100.9 FL — HIGH (ref 80–100)
MONOCYTES # BLD AUTO: 0.71 K/UL — SIGNIFICANT CHANGE UP (ref 0–0.9)
MONOCYTES NFR BLD AUTO: 7.2 % — SIGNIFICANT CHANGE UP (ref 2–14)
NEUTROPHILS # BLD AUTO: 6.14 K/UL — SIGNIFICANT CHANGE UP (ref 1.8–7.4)
NEUTROPHILS NFR BLD AUTO: 61.9 % — SIGNIFICANT CHANGE UP (ref 43–77)
NRBC # BLD: 0 /100 WBCS — SIGNIFICANT CHANGE UP (ref 0–0)
NRBC # BLD: 0 /100 WBCS — SIGNIFICANT CHANGE UP (ref 0–0)
PHOSPHATE SERPL-MCNC: 3.4 MG/DL — SIGNIFICANT CHANGE UP (ref 2.5–4.5)
PLATELET # BLD AUTO: 335 K/UL — SIGNIFICANT CHANGE UP (ref 150–400)
PLATELET # BLD AUTO: 340 K/UL — SIGNIFICANT CHANGE UP (ref 150–400)
POTASSIUM SERPL-MCNC: 3.9 MMOL/L — SIGNIFICANT CHANGE UP (ref 3.5–5.3)
POTASSIUM SERPL-SCNC: 3.9 MMOL/L — SIGNIFICANT CHANGE UP (ref 3.5–5.3)
PROT SERPL-MCNC: 6.1 G/DL — SIGNIFICANT CHANGE UP (ref 6–8.3)
PROTHROM AB SERPL-ACNC: 10.8 SEC — SIGNIFICANT CHANGE UP (ref 10.5–13.4)
RBC # BLD: 2.32 M/UL — LOW (ref 3.8–5.2)
RBC # BLD: 2.5 M/UL — LOW (ref 3.8–5.2)
RBC # FLD: 19.1 % — HIGH (ref 10.3–14.5)
RBC # FLD: 19.6 % — HIGH (ref 10.3–14.5)
SODIUM SERPL-SCNC: 140 MMOL/L — SIGNIFICANT CHANGE UP (ref 135–145)
TIBC SERPL-MCNC: 381 UG/DL — SIGNIFICANT CHANGE UP (ref 220–430)
TRANSFERRIN SERPL-MCNC: 284 MG/DL — SIGNIFICANT CHANGE UP (ref 200–360)
UIBC SERPL-MCNC: 335 UG/DL — SIGNIFICANT CHANGE UP (ref 110–370)
VIT B12 SERPL-MCNC: 341 PG/ML — SIGNIFICANT CHANGE UP (ref 232–1245)
WBC # BLD: 9.73 K/UL — SIGNIFICANT CHANGE UP (ref 3.8–10.5)
WBC # BLD: 9.92 K/UL — SIGNIFICANT CHANGE UP (ref 3.8–10.5)
WBC # FLD AUTO: 9.73 K/UL — SIGNIFICANT CHANGE UP (ref 3.8–10.5)
WBC # FLD AUTO: 9.92 K/UL — SIGNIFICANT CHANGE UP (ref 3.8–10.5)

## 2023-06-15 PROCEDURE — P9016: CPT

## 2023-06-15 PROCEDURE — 83735 ASSAY OF MAGNESIUM: CPT

## 2023-06-15 PROCEDURE — 82272 OCCULT BLD FECES 1-3 TESTS: CPT

## 2023-06-15 PROCEDURE — 85027 COMPLETE CBC AUTOMATED: CPT

## 2023-06-15 PROCEDURE — 82607 VITAMIN B-12: CPT

## 2023-06-15 PROCEDURE — 80053 COMPREHEN METABOLIC PANEL: CPT

## 2023-06-15 PROCEDURE — 86850 RBC ANTIBODY SCREEN: CPT

## 2023-06-15 PROCEDURE — 83550 IRON BINDING TEST: CPT

## 2023-06-15 PROCEDURE — 36415 COLL VENOUS BLD VENIPUNCTURE: CPT

## 2023-06-15 PROCEDURE — 85610 PROTHROMBIN TIME: CPT

## 2023-06-15 PROCEDURE — 86923 COMPATIBILITY TEST ELECTRIC: CPT

## 2023-06-15 PROCEDURE — 88305 TISSUE EXAM BY PATHOLOGIST: CPT

## 2023-06-15 PROCEDURE — 85730 THROMBOPLASTIN TIME PARTIAL: CPT

## 2023-06-15 PROCEDURE — 43239 EGD BIOPSY SINGLE/MULTIPLE: CPT | Mod: GC

## 2023-06-15 PROCEDURE — 82746 ASSAY OF FOLIC ACID SERUM: CPT

## 2023-06-15 PROCEDURE — 85025 COMPLETE CBC W/AUTO DIFF WBC: CPT

## 2023-06-15 PROCEDURE — 43239 EGD BIOPSY SINGLE/MULTIPLE: CPT

## 2023-06-15 PROCEDURE — 99285 EMERGENCY DEPT VISIT HI MDM: CPT | Mod: 25

## 2023-06-15 PROCEDURE — 84100 ASSAY OF PHOSPHORUS: CPT

## 2023-06-15 PROCEDURE — 88305 TISSUE EXAM BY PATHOLOGIST: CPT | Mod: 26

## 2023-06-15 PROCEDURE — 84466 ASSAY OF TRANSFERRIN: CPT

## 2023-06-15 PROCEDURE — 99239 HOSP IP/OBS DSCHRG MGMT >30: CPT

## 2023-06-15 PROCEDURE — 83540 ASSAY OF IRON: CPT

## 2023-06-15 PROCEDURE — 86901 BLOOD TYPING SEROLOGIC RH(D): CPT

## 2023-06-15 PROCEDURE — 99222 1ST HOSP IP/OBS MODERATE 55: CPT | Mod: GC,25

## 2023-06-15 PROCEDURE — 71045 X-RAY EXAM CHEST 1 VIEW: CPT

## 2023-06-15 PROCEDURE — 82728 ASSAY OF FERRITIN: CPT

## 2023-06-15 PROCEDURE — 36430 TRANSFUSION BLD/BLD COMPNT: CPT

## 2023-06-15 PROCEDURE — 86900 BLOOD TYPING SEROLOGIC ABO: CPT

## 2023-06-15 PROCEDURE — 96374 THER/PROPH/DIAG INJ IV PUSH: CPT

## 2023-06-15 RX ORDER — PANTOPRAZOLE SODIUM 20 MG/1
40 TABLET, DELAYED RELEASE ORAL EVERY 12 HOURS
Refills: 0 | Status: DISCONTINUED | OUTPATIENT
Start: 2023-06-15 | End: 2023-06-15

## 2023-06-15 RX ORDER — CHLORHEXIDINE GLUCONATE 213 G/1000ML
1 SOLUTION TOPICAL DAILY
Refills: 0 | Status: DISCONTINUED | OUTPATIENT
Start: 2023-06-15 | End: 2023-06-15

## 2023-06-15 RX ORDER — DIPHENHYDRAMINE HCL 50 MG
12.5 CAPSULE ORAL ONCE
Refills: 0 | Status: DISCONTINUED | OUTPATIENT
Start: 2023-06-15 | End: 2023-06-15

## 2023-06-15 RX ORDER — PANTOPRAZOLE SODIUM 20 MG/1
1 TABLET, DELAYED RELEASE ORAL
Qty: 30 | Refills: 0
Start: 2023-06-15 | End: 2023-07-14

## 2023-06-15 RX ORDER — PANTOPRAZOLE SODIUM 20 MG/1
40 TABLET, DELAYED RELEASE ORAL
Refills: 0 | Status: DISCONTINUED | OUTPATIENT
Start: 2023-06-15 | End: 2023-06-15

## 2023-06-15 RX ORDER — DIPHENHYDRAMINE HCL 50 MG
25 CAPSULE ORAL ONCE
Refills: 0 | Status: COMPLETED | OUTPATIENT
Start: 2023-06-15 | End: 2023-06-15

## 2023-06-15 RX ADMIN — Medication 25 MILLIGRAM(S): at 06:17

## 2023-06-15 RX ADMIN — DULOXETINE HYDROCHLORIDE 60 MILLIGRAM(S): 30 CAPSULE, DELAYED RELEASE ORAL at 16:53

## 2023-06-15 NOTE — PROGRESS NOTE ADULT - ASSESSMENT
63F with HTN, asthma, migraines p/w abnormal outpatient labs from cardiologists office. Admitted for anemia likely 2/2 UGIB.

## 2023-06-15 NOTE — PRE PROCEDURE NOTE - PRE PROCEDURE EVALUATION
Attending Physician:  Dr Sue                          Procedure: EGD    Indication for Procedure: Melena   ________________________________________________________  PAST MEDICAL & SURGICAL HISTORY:  Asthma      Migraine      HTN (hypertension)      History of appendectomy        ALLERGIES:  Allergy Status Unknown    HOME MEDICATIONS:  Albuterol (Eqv-Proventil HFA) 90 mcg/inh inhalation aerosol: 2 puff(s) inhaled once a day as needed for  shortness of breath and/or wheezing  DULoxetine 60 mg oral delayed release capsule: 1 tab(s) orally once a day  traZODone 50 mg oral tablet: 1 tab(s) orally once a day (at bedtime)    AICD/PPM: [ ] yes   [x ] no    PERTINENT LAB DATA:                        7.5    9.92  )-----------( 340      ( 15 Sree 2023 06:55 )             23.4     06-15    140  |  104  |  11  ----------------------------<  103<H>  3.9   |  26  |  0.71    Ca    9.1      15 Sree 2023 06:49  Phos  3.4     06-15  Mg     2.3     06-15    TPro  6.1  /  Alb  4.0  /  TBili  0.3  /  DBili  x   /  AST  31  /  ALT  25  /  AlkPhos  64  06-15    PT/INR - ( 15 Sree 2023 06:55 )   PT: 10.8 sec;   INR: 0.93 ratio         PTT - ( 15 Sree 2023 06:55 )  PTT:26.4 sec            PHYSICAL EXAMINATION:    Height (cm): 170.2  Weight (kg): 75.8  BMI (kg/m2): 26.2  BSA (m2): 1.87T(C): 36.8  HR: 92  BP: 128/72  RR: 16  SpO2: 96%    Constitutional: NAD    Neck:  No JVD  Respiratory: no respiratory distress   Cardiovascular: RRR  Extremities: No peripheral edema  Neurological: A/O x 3, no focal deficits        COMMENTS:    The patient is a suitable candidate for the planned procedure unless box checked [ ]  No, explain:

## 2023-06-15 NOTE — DISCHARGE NOTE PROVIDER - NSDCMRMEDTOKEN_GEN_ALL_CORE_FT
Albuterol (Eqv-Proventil HFA) 90 mcg/inh inhalation aerosol: 2 puff(s) inhaled once a day as needed for  shortness of breath and/or wheezing  DULoxetine 60 mg oral delayed release capsule: 1 tab(s) orally once a day  pantoprazole 40 mg oral delayed release tablet: 1 tab(s) orally once a day (before a meal)  traZODone 50 mg oral tablet: 1 tab(s) orally once a day (at bedtime)

## 2023-06-15 NOTE — PATIENT PROFILE ADULT - LEGAL HELP
[facility] DIVISION of INFECTIOUS DISEASE  Arturo Olivas MD PhD, Haylee Umanzor MD, Andressa Brantley MD, Billy Valenzuela MD  and providing coverage with Alexa Canas MD and Eusebio Chairez MD  Providing Infectious Disease Consultations at [facility], [facility], [facility]    Office# [phone] to schedule follow up appointments  Answering Service for urgent calls or New Consults [phone]  Cell# to text for urgent issues Arturo Olivas [phone]     infectious diseases progress note:    BREA BECKHAM is a 75y y. o. Female patient    Patient remains in [facility]    Allergies    codeine (Hives)    Intolerances        ANTIBIOTICS/RELEVANT:  antimicrobials  fluconAZOLE   Tablet 100 milliGRAM(s) Oral daily  piperacillin/tazobactam IVPB.. 3.375 Gram(s) IV Intermittent every 12 hours    immunologic:    OTHER:  acetaminophen   Tablet .. 650 milliGRAM(s) Oral every 6 hours PRN  albuterol/ipratropium for Nebulization 3 milliLiter(s) Nebulizer every 6 hours  aspirin  chewable 81 milliGRAM(s) Enteral Tube daily  bisacodyl Suppository 10 milliGRAM(s) Rectal at bedtime  chlorhexidine 0.12% Liquid 15 milliLiter(s) Oral Mucosa two times a day  chlorhexidine 2% Cloths 1 Application(s) Topical daily  dextrose 40% Gel 15 Gram(s) Oral once  dextrose 5%. 1000 milliLiter(s) IV Continuous <Continuous>  dextrose 5%. 1000 milliLiter(s) IV Continuous <Continuous>  dextrose 50% Injectable 25 Gram(s) IV Push once  dextrose 50% Injectable 12.5 Gram(s) IV Push once  dextrose 50% Injectable 25 Gram(s) IV Push once  fentaNYL   Patch  12 MICROgram(s)/Hr 1 Patch Transdermal every 72 hours  glucagon  Injectable 1 milliGRAM(s) IntraMuscular once  insulin glargine Injectable (LANTUS) 36 Unit(s) SubCutaneous at bedtime  insulin lispro (ADMELOG) corrective regimen sliding scale   SubCutaneous every 6 hours  lactated ringers. 1000 milliLiter(s) IV Continuous <Continuous>  lactobacillus acidophilus 2 Tablet(s) Oral daily  LORazepam   Injectable 0.5 milliGRAM(s) IV Push three times a day  norepinephrine Infusion 0.05 MICROgram(s)/kG/Min IV Continuous <Continuous>  pantoprazole  Injectable 40 milliGRAM(s) IV Push two times a day  polyethylene glycol 3350 17 Gram(s) Oral daily  senna 2 Tablet(s) Oral at bedtime  simethicone 80 milliGRAM(s) Chew every 6 hours  sodium chloride 0.9% lock flush 10 milliLiter(s) IV Push every 1 hour PRN  sucralfate suspension 1 Gram(s) Oral four times a day      Objective:  Last 24-Vital Signs Last 24 Hrs  T(C): 37.1 (10 Sep 2021 08:08), Max: 37.7 (09 Sep 2021 12:00)  T(F): 98.8 (10 Sep 2021 08:08), Max: 99.9 (09 Sep 2021 12:00)  HR: 65 (10 Sep 2021 09:00) (65 - 135)  BP: 91/48 (10 Sep 2021 09:00) (91/48 - 196/48)  BP(mean): 61 (10 Sep 2021 09:00) (61 - 90)  RR: 14 (10 Sep 2021 09:00) (11 - 50)  SpO2: 100% (10 Sep 2021 09:00) (97% - 100%)    T(C): 37.1 (09-10-21 @ 08:08), Max: 38.1 (09-09-21 @ 00:00)  T(F): 98.8 (09-10-21 @ 08:08), Max: 100.6 (09-09-21 @ 00:00)  T(C): 37.1 (09-10-21 @ 08:08), Max: 38.4 (09-07-21 @ 09:55)  T(F): 98.8 (09-10-21 @ 08:08), Max: 101.1 (09-07-21 @ 09:55)  T(C): 37.1 (09-10-21 @ 08:08), Max: 38.4 (09-07-21 @ 09:55)  T(F): 98.8 (09-10-21 @ 08:08), Max: 101.1 (09-07-21 @ 09:55)    PHYSICAL EXAM:  Constitutional: Well-developed, well nourished  Eyes: PERRLA, EOMI  Ear/Nose/Throat: oropharynx normal	  Neck: no JVD, no lymphadenopathy, supple  Respiratory: no accessory muscle use, lung fields bilaterally clear  Cardiovascular: distant  Gastrointestinal: soft, NT, no HSM, BS-normal, feeding tube  Extremities: no clubbing, no cyanosis, left foot great toe with swelling, erythema, dark areas  Neuro: patient not alert, nonverbal    Mode: AC/ CMV (Assist Control/ Continuous Mandatory Ventilation), RR (machine): 14, TV (machine): 400, FiO2: 30, PEEP: 5, ITime: 1, MAP: 11, PIP: 31    LABS:                        7.0    5.50  )-----------( 206      ( 10 Sep 2021 08:17 )             24.1       WBC 5.50  09-10 @ 08:17  WBC 7.69  09-09 @ 15:06  WBC 4.84  09-09 @ 07:04  WBC 7.65  09-08 @ 09:03  WBC 8.44  09-08 @ 06:55  WBC 26.45  09-07 @ 10:38      09-10    140  |  109<H>  |  19  ----------------------------<  185<H>  4.3   |  24  |  1.08    Ca    8.4      10 Sep 2021 08:17  Phos  2.0     09-09  Mg     1.7     09-10    TPro  6.0  /  Alb  2.0<L>  /  TBili  0.4  /  DBili  x   /  AST  39  /  ALT  36  /  AlkPhos  72  09-10      Creatinine, Serum: 1.08 mg/dL (09-10-21 @ 08:17)  Creatinine, Serum: 1.24 mg/dL (09-09-21 @ 07:04)  Creatinine, Serum: 1.61 mg/dL (09-08-21 @ 06:55)  Creatinine, Serum: 2.58 mg/dL (09-07-21 @ 10:56)      PT/INR - ( 09 Sep 2021 07:04 )   PT: 12.4 sec;   INR: 1.03 ratio                   MICROBIOLOGY:              RADIOLOGY & ADDITIONAL STUDIES:   no

## 2023-06-15 NOTE — DISCHARGE NOTE PROVIDER - NSDCCPTREATMENT_GEN_ALL_CORE_FT
PRINCIPAL PROCEDURE  Procedure: Upper endoscopy  Findings and Treatment: Impression:          - Widely patent Schatzki ring.                       - Non-bleeding gastric ulcer with a clean ulcer base (William Class III).                        This is likely source of melena.                       - Normal duodenal bulb, first portion of the duodenum and second portion of                        the duodenum.                       - Biopsies were taken with a cold forceps for histology in the gastric body                    and in the gastric antrum.  Recommendation:      - Resume regular diet today.                       - Return patient to hospital [facility] for ongoing care.                       - Use a proton pump inhibitor PO daily for 4 weeks.                - Await pathology results.                       -No high risk stigmata found. OK for DC home        SECONDARY PROCEDURE  Procedure: XR chest AP  Findings and Treatment: TECHNIQUE: Single frontal, portable view of thechest was obtained.  COMPARISON: None  FINDINGS:  The heart is normal in size. Right-sided or duplicated aortic arch, a   normal variant.  Pleural/parenchymal calcifications of the right and left upper chest and   along the lung apices.  The lungs are otherwise clear.  There is no pneumothorax or pleural effusion.  IMPRESSION:  Bilateral pleural calcifications and possible aortic variation which can   be further evaluated with CT of the chest if prior plain films are not   available for comparison.

## 2023-06-15 NOTE — PROGRESS NOTE ADULT - ATTENDING COMMENTS
This is a 63F with HTN, asthma, migraines p/w low Hb in outpatient labs from cardiologists office as she was tachycardic, feeling weak, noted some black stool. Has been using NSAIDs for arthritis, LBP.  Found to have Hb 6.4, s/p 1 PRBC transfusion.    1. Acute UGI bleed with blood loss anemia  2. HTN  3. Migraine  4. Chronic LBP    - Feels lot better, Hb 7.5 from 6.4 after 1 unit PRBC, BP is stable, on PPI  - GI evaluated and did EGD-> one  with no active bleed, biopsy taken  - will repeat H/H, if stable will d/c her home today as GI cleared on PO PPI for 4 weeks  - Outpatient GI f/u  - advised not to use NSAIDs  - dc time 37 min

## 2023-06-15 NOTE — DISCHARGE NOTE PROVIDER - CARE PROVIDER_API CALL
Dr. Pardeep  Phone: (   )    -  Fax: (   )    -  Follow Up Time:     Eliseo Bolton  Gastroenterology  [address] [place] [address], [facility] [[email or web address] [place] [address]  Phone: [phone]  Fax: [phone]  Established Patient  Follow Up Time:

## 2023-06-15 NOTE — DISCHARGE NOTE PROVIDER - NSDCFUADDAPPT_GEN_ALL_CORE_FT
Please follow up with your primary care provider and Gastroenterology. Please call to schedule an appointment to review your results from your biopsy and to discuss further work up of incidental chest x-ray findings.

## 2023-06-15 NOTE — DISCHARGE NOTE PROVIDER - PROVIDER TOKENS
FREE:[LAST:[Anwar],FIRST:[],PHONE:[(   )    -],FAX:[(   )    -]],PROVIDER:[TOKEN:[[id number]],ESTABLISHEDPATIENT:[T]]

## 2023-06-15 NOTE — PATIENT PROFILE ADULT - NSPRESCRALCSCORE_GEN_A_NUR_CAL
butalbital-APAP-caffeine, hydrALAZINE, benzonatate    Data:     Past Medical History:   has a past medical history of Acute cystitis without hematuria, Arthritis, Chronic daily headache, GERD (gastroesophageal reflux disease), Hyperlipidemia, Hypertension, Moderate malnutrition ([address] Multiple sclerosis ([facility] [address] Neuropathy, Pneumonia, and Vitamin D deficiency. Social History:   reports that she has never smoked. She has never used smokeless tobacco. She reports that she does not drink alcohol or use drugs. Family History: History reviewed. No pertinent family history. Vitals:  BP (!) 108/55   Pulse 101   Temp 98.1 °F (36.7 °C) (Oral)   Resp 16   Ht 4' 11\" (1.499 m)   Wt 132 lb 15 oz (60.3 kg)   SpO2 93%   BMI 26.85 kg/m²   Temp (24hrs), Av.5 °F (36.9 °C), Min:98.1 °F (36.7 °C), Max:98.9 °F (37.2 °C)    No results for input(s): POCGLU in the last 72 hours. I/O(24Hr): Intake/Output Summary (Last 24 hours) at 2021 0724  Last data filed at 2021 0600  Gross per 24 hour   Intake 1400 ml   Output 100 ml   Net 1300 ml       Labs:    [unfilled]    Lab Results   Component Value Date/Time    SPECIAL NOT REPORTED 10/06/2019 04:19 PM     Lab Results   Component Value Date/Time    CULTURE NO GROWTH 10/06/2019 04:19 PM       [unfilled]    Radiology:    Ct Head Wo Contrast    Result Date: 2021  EXAMINATION: CT OF THE HEAD WITHOUT CONTRAST  2021 4:32 pm TECHNIQUE: CT of the head was performed without the administration of intravenous contrast. Dose modulation, iterative reconstruction, and/or weight based adjustment of the mA/kV was utilized to reduce the radiation dose to as low as reasonably achievable. COMPARISON: MRI of the brain 2020.  HISTORY: ORDERING SYSTEM PROVIDED HISTORY: dizziness, vomiting TECHNOLOGIST PROVIDED HISTORY: dizziness, vomiting Decision Support Exception->Emergency Medical Condition (MA) Reason for Exam: Dizzines, vomiting Acuity: Unknown Type of Exam: Unknown Mechanism of Injury: Pt c/o worsening double vision and dizziness, h/o multiple sclerosis FINDINGS: BRAIN/VENTRICLES: There is no acute intracranial hemorrhage, mass effect or midline shift. No abnormal extra-axial fluid collection. The gray-white differentiation is maintained without evidence of an acute infarct. There is no evidence of hydrocephalus. The ventricular system is mildly prominent, but still well within limits of normal for size for the age of the patient and with compensatory prominence of sulcation. There are areas of decreased attenuation density in the deep white matter and periventricular regions compatible with areas of old micro ischemic change. There is some prominence of the cerebellar and vermian sulci. Calcifications are present in vessels at the base of the brain. ORBITS: The visualized portion of the orbits demonstrate no acute abnormality. SINUSES: The visualized paranasal sinuses and mastoid air cells demonstrate no acute abnormality. SOFT TISSUES/SKULL:  No acute abnormality of the visualized skull or soft tissues. Senescent changes compatible with the age of the patient. No acute intracranial abnormality. Xr Chest Portable    Result Date: 2/25/2021  EXAMINATION: ONE XRAY VIEW OF THE CHEST 2/25/2021 3:20 pm COMPARISON: 10/09/2019. HISTORY: ORDERING SYSTEM PROVIDED HISTORY: cough, dizziness TECHNOLOGIST PROVIDED HISTORY: cough, dizziness Reason for Exam: cough, dizziness Acuity: Unknown Type of Exam: Unknown FINDINGS: Postoperative changes were noted from prior discectomies in the lower cervical spine. Mild left ventricular enlargement was noted without pneumonia, interstitial edema or pleural effusions. Elevation of the right hemidiaphragm was noted. Old healed fractures involve the posterolateral aspect of the left 3rd, 4th, 5th, 6th and left 7th ribs. A right shoulder hemiarthroplasty was noted with humeral and glenoid components in their expected positions. Mild left ventricular enlargement without pneumonia, interstitial edema or pleural effusion. Old healed fractures involve the posterolateral aspect of the left 3rd through 7th ribs. Physical Examination:        Physical Exam  Constitutional:       General: She is awake. Appearance: Normal appearance. She is overweight. Comments: Patient had difficulty swallowing semisolids and solids. She vomited everything out and complaint of something being stuck in her throat. Patient was kept n.p.o. and speech pathology was consulted   HENT:      Head: Normocephalic and atraumatic. Nose: Nose normal.      Mouth/Throat:      Mouth: Mucous membranes are moist.   Eyes:      Pupils: Pupils are equal, round, and reactive to light. Neck:      Musculoskeletal: Normal range of motion. Cardiovascular:      Rate and Rhythm: Normal rate and regular rhythm. Pulses: Normal pulses. Heart sounds: Normal heart sounds. Pulmonary:      Effort: Pulmonary effort is normal.      Breath sounds: Normal breath sounds. Abdominal:      General: Bowel sounds are normal.      Tenderness: There is abdominal tenderness. Comments: Abdominal pain with improvement since yesterday   Musculoskeletal:      Comments: Weakness on the left side and facial asymmetry   Neurological:      Mental Status: She is alert and oriented to person, place, and time. Cranial Nerves: Facial asymmetry present. Motor: Weakness present. Gait: Gait abnormal.   Psychiatric:         Mood and Affect: Mood normal.         Behavior: Behavior normal. Behavior is cooperative.            Assessment:        Primary Problem  Exacerbation of multiple sclerosis [facility])    Active Hospital Problems    Diagnosis Date Noted    [facility] of [facility] Holly Wallace 02/25/2021    Diarrhea [R19.7] 02/25/2021    Chronic headache [R51.9, G89.29] 02/25/2021    Facial asymmetry [Q67.0] 02/25/2021    Acquired spondylolisthesis [M43.10] 10/08/2019    Essential hypertension [I10] 11/07/2018    Hyperlipidemia [E78.5] 11/07/2018    Abnormal gait [R26.9] 10/07/2014       Plan:         Patient status Admit as inpatient in the  Progressive Unit/Step down  Overall status: Improving  Date of admission: 2/25     ~MS exacerbation  Reasoning: Past medical history of multiple sclerosis. Abnormal bowel movement. Dizziness and ataxia. IV Solu-Medrol 500 mg daily for the next 5 days. Dysphagia  Past medical history of dysphagia primarily with semisolids. Consult to speech pathologist: N.p.o. for solids and liquids. Modified barium swallow pending.     ~EDINSON 2/2 dehydration 2/2 diarrhea/2 suspected food poisoning   Cr: 1.13 (baseline 0.80)  Maintenance therapy 50 mL/h. FeNa  1.3 %. Intrinsic  Diarrhea resolved.     ~Lightheadedness   Orthostatic pulse and pressure pending.     ~Chronic severe headache  Norco.  Fioricet as needed    ~Hyperlipidemia  Atorvastatin 20 mg. Hypertension   Patient on IV Solu-Medrol 500 mg IV for MS exacerbation. Respiratory care and eval on board. DVT prophylaxis: Enoxaparin 40 mg subcu  GI prophylaxis: Pepcid 20 mg tablet  Discharge planning: Awaiting recommendations  Diet: Renal diet  OT: Patient will benefit from intensive level of therapy after discharge from the facility. They should be able to tolerate 3-hours of Combined OT/PT/ST over 5 days/week or at least 900 minutes of  Combined Therapy over 7 days. Equipment Needed: (TBD). PM&R on board.     Jhonathan Peralta MD  2/26/2021  7:24 AM 4

## 2023-06-15 NOTE — DISCHARGE NOTE PROVIDER - HOSPITAL COURSE
63F with HTN, asthma, migraines p/w abnormal outpatient labs from cardiologists office. Hgb noted to be 7.1 (baseline ~13 on HIE). Pt reports dizziness and palpitations over the weekend. Her HR at that time 110- 130s with stable BP. She had gone to the cardiologist (Dr. Tyler Metz) on Monday who had obtained an EKG and blood work. TTE was completed yesterday per patient. States she had one episode of melena on Saturday, after which she has not had any further bowel movements. Today, denies dizziness. Reports palpitations and fatigue. Denies N/V/F/C, chest pain, SOB, dysuria. Reports feeling of fullness/ distension and mild epigastric pain. She has never been on AC. Reports taking 2 tablets of Excedrin ~3x/ week for 25 years. Denies increased alochol use, spicy foods, and coffee intake. Pt denies vaginal bleeding or history of fibroids. Menopause 20 years ago.     In the ED- HR 60- 90, - 130, room air  Labs- Hgb 6.4, WBC 12.07, Plt 352, K 3.3, Cr 0.73, fecal occult +  CXR clear lungs  Meds- protonix 80, 1u PRBC     Patient was seen by GI. Endoscopy done, showed non-bleeding gastric ulcer. Biopsies sent. Hemoglobin stable on repeat labs, no signs of bleeding. Patient recommended for PPI and Gi outpatient follow up. Patient incidentally found on CXR to have pleural calcifications. Patient does not want to stay inpatient for further imaging, wants to follow up outpatient. Patient stable for discharge with close outpatient follow up with GI, PCP.   This is a 63F with HTN, asthma, migraines p/w abnormal outpatient labs from cardiologists office. Hgb noted to be 7.1 (baseline ~13 on HIE). Pt reports dizziness and palpitations over the weekend. Her HR at that time 110- 130s with stable BP. She had gone to the cardiologist (Dr. Tyler Metz) on Monday who had obtained an EKG and blood work. TTE was completed yesterday per patient. States she had one episode of melena on Saturday, after which she has not had any further bowel movements. Today, denies dizziness. Reports palpitations and fatigue. Denies N/V/F/C, chest pain, SOB, dysuria. Reports feeling of fullness/ distension and mild epigastric pain. She has never been on AC. Reports taking 2 tablets of Excedrin ~3x/ week for 25 years. Denies increased alochol use, spicy foods, and coffee intake. Pt denies vaginal bleeding or history of fibroids. Menopause 20 years ago.     In the ED- HR 60- 90, - 130, room air  Labs- Hgb 6.4, WBC 12.07, Plt 352, K 3.3, Cr 0.73, fecal occult +  CXR clear lungs  Meds- protonix 80, 1u PRBC     Patient was seen by GI. Endoscopy done, showed non-bleeding gastric ulcer. Biopsies sent. Hemoglobin stable on repeat labs, no signs of bleeding. Patient recommended for PPI and Gi outpatient follow up. Patient incidentally found on CXR to have pleural calcifications. Patient does not want to stay inpatient for further imaging, wants to follow up outpatient. Patient stable for discharge with close outpatient follow up with GI, PCP.

## 2023-06-15 NOTE — DISCHARGE NOTE PROVIDER - NSFOLLOWUPCLINICS_GEN_ALL_ED_FT
[facility] - Primary Care  Primary Care  [address] [place] [address]  Phone: [phone]  Fax:

## 2023-06-15 NOTE — PROGRESS NOTE ADULT - PROBLEM SELECTOR PLAN 2
- currently off meds; previously on lisinopril which was switched amlodipine however developed LE swelling   - monitor BP

## 2023-06-15 NOTE — CONSULT NOTE ADULT - SUBJECTIVE AND OBJECTIVE BOX
HPI:    Allergies:  Allergy Status Unknown      Home Medications:    Hospital Medications:  acetaminophen     Tablet .. 650 milliGRAM(s) Oral every 6 hours PRN  DULoxetine 60 milliGRAM(s) Oral daily  pantoprazole  Injectable 40 milliGRAM(s) IV Push every 12 hours  traZODone 50 milliGRAM(s) Oral at bedtime      PMHX/PSHX:  Asthma    Migraine    HTN (hypertension)    History of appendectomy        Family history:      Denies family history of colon cancer/polyps, stomach cancer/polyps, pancreatic cancer/masses, liver cancer/disease, ovarian cancer and endometrial cancer.    Social History:   Tob: Denies  EtOH: Denies  Illicit Drugs: Denies    ROS:     General:  No wt loss, fevers, chills, night sweats, fatigue  Eyes:  Good vision, no reported pain  ENT:  No sore throat, pain, runny nose, dysphagia  CV:  No pain, palpitations, hypo/hypertension  Pulm:  No dyspnea, cough, tachypnea, wheezing  GI:  see HPI  :  No pain, bleeding, incontinence, nocturia  Muscle:  No pain, weakness  Neuro:  No weakness, tingling, memory problems  Psych:  No fatigue, insomnia, mood problems, depression  Endocrine:  No polyuria, polydipsia, cold/heat intolerance  Heme:  No petechiae, ecchymosis, easy bruisability  Skin:  No rash, tattoos, scars, edema    PHYSICAL EXAM:     GENERAL:  No acute distress  HEENT:  NCAT, no scleral icterus   CHEST:  no respiratory distress  HEART:  Regular rate and rhythm  ABDOMEN:  Soft, non-tender, non-distended, normoactive bowel sounds,  no masses  EXTREMITIES: No edema  SKIN:  No rash/erythema/ecchymoses/petechiae/wounds/abscess/warm/dry  NEURO:  Alert and oriented x 3, no asterixis    Vital Signs:  Vital Signs Last 24 Hrs  T(C): 36.7 (15 Sree 2023 04:40), Max: 37 (14 Jun 2023 15:11)  T(F): 98 (15 Sree 2023 04:40), Max: 98.6 (14 Jun 2023 15:11)  HR: 78 (15 Sree 2023 04:40) (69 - 95)  BP: 117/69 (15 Sree 2023 04:40) (117/69 - 134/82)  BP(mean): --  RR: 18 (15 Sree 2023 04:40) (16 - 20)  SpO2: 95% (15 Sree 2023 04:40) (95% - 100%)    Parameters below as of 15 Sree 2023 04:40  Patient On (Oxygen Delivery Method): room air      Daily Height in cm: 170.18 (15 Sree 2023 03:30)    Daily     LABS:                        7.5    9.92  )-----------( 340      ( 15 Sree 2023 06:55 )             23.4     Mean Cell Volume: 100.9 fl (06-15-23 @ 06:55)    06-15    140  |  104  |  11  ----------------------------<  103<H>  3.9   |  26  |  0.71    Ca    9.1      15 Sree 2023 06:49  Phos  3.4     06-15  Mg     2.3     06-15    TPro  6.1  /  Alb  4.0  /  TBili  0.3  /  DBili  x   /  AST  31  /  ALT  25  /  AlkPhos  64  06-15    LIVER FUNCTIONS - ( 15 Sree 2023 06:49 )  Alb: 4.0 g/dL / Pro: 6.1 g/dL / ALK PHOS: 64 U/L / ALT: 25 U/L / AST: 31 U/L / GGT: x           PT/INR - ( 15 Sree 2023 06:55 )   PT: 10.8 sec;   INR: 0.93 ratio         PTT - ( 15 Sree 2023 06:55 )  PTT:26.4 sec                            7.5    9.92  )-----------( 340      ( 15 Sree 2023 06:55 )             23.4                         6.4    12.07 )-----------( 352      ( 14 Jun 2023 19:47 )             19.5       Imaging:             HPI: 63F with HTN, asthma, migraines p/w abnormal outpatient labs from cardiologists office. Hgb noted to be 6.4 on admission (baseline ~13 on HIE).  Pt reports dizziness and palpitations over the weekend. Patient had one episode of melena on Saturday with no other episodes of melena. No previous AC. Reports taking 2 tablets of Excedrin ~3x/ week for 25 years. Had TTE yesterday at cardiology office given palpitations.     Allergies:  Allergy Status Unknown      Home Medications:    Hospital Medications:  acetaminophen     Tablet .. 650 milliGRAM(s) Oral every 6 hours PRN  DULoxetine 60 milliGRAM(s) Oral daily  pantoprazole  Injectable 40 milliGRAM(s) IV Push every 12 hours  traZODone 50 milliGRAM(s) Oral at bedtime      PMHX/PSHX:  Asthma    Migraine    HTN (hypertension)    History of appendectomy        Family history:      Denies family history of colon cancer/polyps, stomach cancer/polyps, pancreatic cancer/masses, liver cancer/disease, ovarian cancer and endometrial cancer.    Social History:   Tob: Denies  EtOH: Denies  Illicit Drugs: Denies    ROS:     General:  No wt loss, fevers, chills, night sweats, fatigue  Eyes:  Good vision, no reported pain  ENT:  No sore throat, pain, runny nose, dysphagia  CV:  No pain, palpitations, hypo/hypertension  Pulm:  No dyspnea, cough, tachypnea, wheezing  GI:  see HPI  :  No pain, bleeding, incontinence, nocturia  Muscle:  No pain, weakness  Neuro:  No weakness, tingling, memory problems  Psych:  No fatigue, insomnia, mood problems, depression  Endocrine:  No polyuria, polydipsia, cold/heat intolerance  Heme:  No petechiae, ecchymosis, easy bruisability  Skin:  No rash, tattoos, scars, edema    PHYSICAL EXAM:     GENERAL:  No acute distress, patient appears comfortable   HEENT:  NCAT, no scleral icterus   CHEST:  no respiratory distress  HEART:  Regular rate and rhythm  ABDOMEN:  Soft, non-tender, non-distended, no masses  EXTREMITIES: No edema  SKIN:  No rash/erythema/ecchymoses/petechiae/wounds/abscess/warm/dry  NEURO:  Alert and oriented x 3, no asterixis    Vital Signs:  Vital Signs Last 24 Hrs  T(C): 36.7 (15 Sree 2023 04:40), Max: 37 (14 Jun 2023 15:11)  T(F): 98 (15 Sree 2023 04:40), Max: 98.6 (14 Jun 2023 15:11)  HR: 78 (15 Sree 2023 04:40) (69 - 95)  BP: 117/69 (15 Sree 2023 04:40) (117/69 - 134/82)  BP(mean): --  RR: 18 (15 Sree 2023 04:40) (16 - 20)  SpO2: 95% (15 Sree 2023 04:40) (95% - 100%)    Parameters below as of 15 Sree 2023 04:40  Patient On (Oxygen Delivery Method): room air      Daily Height in cm: 170.18 (15 Sree 2023 03:30)    Daily     LABS:                        7.5    9.92  )-----------( 340      ( 15 Sree 2023 06:55 )             23.4     Mean Cell Volume: 100.9 fl (06-15-23 @ 06:55)    06-15    140  |  104  |  11  ----------------------------<  103<H>  3.9   |  26  |  0.71    Ca    9.1      15 Sree 2023 06:49  Phos  3.4     06-15  Mg     2.3     06-15    TPro  6.1  /  Alb  4.0  /  TBili  0.3  /  DBili  x   /  AST  31  /  ALT  25  /  AlkPhos  64  06-15    LIVER FUNCTIONS - ( 15 Sree 2023 06:49 )  Alb: 4.0 g/dL / Pro: 6.1 g/dL / ALK PHOS: 64 U/L / ALT: 25 U/L / AST: 31 U/L / GGT: x           PT/INR - ( 15 Sree 2023 06:55 )   PT: 10.8 sec;   INR: 0.93 ratio         PTT - ( 15 Sree 2023 06:55 )  PTT:26.4 sec                            7.5    9.92  )-----------( 340      ( 15 Sree 2023 06:55 )             23.4                         6.4    12.07 )-----------( 352      ( 14 Jun 2023 19:47 )             19.5       Imaging:

## 2023-06-15 NOTE — DISCHARGE NOTE NURSING/CASE MANAGEMENT/SOCIAL WORK - NSDCPEFALRISK_GEN_ALL_CORE
For information on Fall & Injury Prevention, visit: [email or web address] OR  [email or web address] OR  https://www.cdc.gov/steadi/patient.html

## 2023-06-15 NOTE — PROGRESS NOTE ADULT - PROBLEM SELECTOR PLAN 1
likely 2/2 UGIB. On admission, Hg 6.5. Pt with dizziness, palpitations, aspirin use > 20 years    - active type and screen  - transfuse if Hg <7  - 1u prbc in ED  - c/w Protonix 40 IVP BID  - GI emailed  - iron labs, folate, B12 pending  -Trend cbc  -Clear liquid diet - Likely 2/2 UGIB. On admission, Hg 6.5. Pt with dizziness, palpitations, aspirin use > 20 years  - Last colonoscopy 3 years ago, wnl.  - s/p 1U PRBC with repeat hgb 7.5  - c/w Protonix 40 IVP BID  - iron studies with MALINI  - f/u GI scope - Likely 2/2 UGIB. On admission, Hg 6.5. Pt with dizziness, palpitations, aspirin use > 20 years  - Last colonoscopy 3 years ago, wnl.  - s/p 1U PRBC with repeat hgb 7.5  - c/w Protonix 40 IVP BID  - iron studies with MALINI but MCV elevated. Folate and B12 wnl  - f/u GI scope

## 2023-06-15 NOTE — DISCHARGE NOTE NURSING/CASE MANAGEMENT/SOCIAL WORK - PATIENT PORTAL LINK FT
You can access the FollowMyHealth Patient Portal offered by [facility] by registering at the following website: [email or web address]. By joining [employer]’s FollowMyHealth portal, you will also be able to view your health information using other applications (apps) compatible with our system.

## 2023-06-15 NOTE — DISCHARGE NOTE PROVIDER - NSDCCPCAREPLAN_GEN_ALL_CORE_FT
PRINCIPAL DISCHARGE DIAGNOSIS  Diagnosis: GIB (gastrointestinal bleeding)  Assessment and Plan of Treatment: You came into the hospital with low blood count (anemia). You were seen by the Gastroenterologists who did an endoscopy which showed an ulcer. Biopsies were sent. You were recommended to follow up with Gastroenterology and your primary care doctor for further work up and results. Please take pantoprazole (anti-acid medicine) as prescribed. If you experience symptoms including but limited to new bleeding, weakness, black stools, fatigue, please seek medical attention or return to the hospital.      SECONDARY DISCHARGE DIAGNOSES  Diagnosis: Pleural calcification  Assessment and Plan of Treatment: You were incidentally found to have pleural calcifications on chest X-ray. You are not having any respiratory symptoms and did not wish to stay in the hospital for further imaging. Please follow up with your primary care provider to get a chest CT for further workup.

## 2023-06-15 NOTE — PROGRESS NOTE ADULT - SUBJECTIVE AND OBJECTIVE BOX
PROGRESS NOTE:   Authored by: Sergio Swenson M.D.   Internal Medicine PGY-1  Please Contact Via Teams    Patient is a 63y old  Female who presents with a chief complaint of abnormal outpatient labs (14 Jun 2023 22:56)      SUBJECTIVE / OVERNIGHT EVENTS:  No acute events overnight.     Tele:    Brief Daily Plan:    MEDICATIONS  (STANDING):  DULoxetine 60 milliGRAM(s) Oral daily  pantoprazole  Injectable 40 milliGRAM(s) IV Push every 12 hours  traZODone 50 milliGRAM(s) Oral at bedtime    MEDICATIONS  (PRN):  acetaminophen     Tablet .. 650 milliGRAM(s) Oral every 6 hours PRN Temp greater or equal to 38C (100.4F), Mild Pain (1 - 3)      CAPILLARY BLOOD GLUCOSE        I&O's Summary      PHYSICAL EXAM:  Vital Signs Last 24 Hrs  T(C): 36.7 (15 Sree 2023 04:40), Max: 37 (14 Jun 2023 15:11)  T(F): 98 (15 Sree 2023 04:40), Max: 98.6 (14 Jun 2023 15:11)  HR: 78 (15 Sree 2023 04:40) (69 - 95)  BP: 117/69 (15 Sree 2023 04:40) (117/69 - 134/82)  BP(mean): --  RR: 18 (15 Sree 2023 04:40) (16 - 20)  SpO2: 95% (15 Sree 2023 04:40) (95% - 100%)    Parameters below as of 15 Sree 2023 04:40  Patient On (Oxygen Delivery Method): room air        CONSTITUTIONAL: NAD, well-developed  RESPIRATORY: Normal respiratory effort; lungs are clear to auscultation bilaterally  CARDIOVASCULAR: Regular rate and rhythm, normal S1 and S2, no murmur/rub/gallop; No lower extremity edema; Peripheral pulses are 2+ bilaterally  ABDOMEN: Nontender to palpation, normoactive bowel sounds, no rebound/guarding; No hepatosplenomegaly  MUSCLOSKELETAL: no clubbing or cyanosis of digits; no joint swelling or tenderness to palpation  PSYCH: A+O to person, place, and time; affect appropriate    LABS:                        7.5    9.92  )-----------( 340      ( 15 Sree 2023 06:55 )             23.4     06-15    140  |  104  |  11  ----------------------------<  103<H>  3.9   |  26  |  0.71    Ca    9.1      15 Sree 2023 06:49  Phos  3.4     06-15  Mg     2.3     06-15    TPro  6.1  /  Alb  4.0  /  TBili  0.3  /  DBili  x   /  AST  31  /  ALT  25  /  AlkPhos  64  06-15    PT/INR - ( 14 Jun 2023 19:47 )   PT: 11.0 sec;   INR: 0.96 ratio         PTT - ( 14 Jun 2023 19:47 )  PTT:28.0 sec        MICRO:        IMAGING: PROGRESS NOTE:   Authored by: Sergio Swenson M.D.   Internal Medicine PGY-1  Please Contact Via Teams    Patient is a 63y old  Female who presents with a chief complaint of abnormal outpatient labs (14 Jun 2023 22:56)      SUBJECTIVE / OVERNIGHT EVENTS:  No acute events overnight. Patient feeling well this morning without palpitations. For upper endoscopy today with GI.      MEDICATIONS  (STANDING):  DULoxetine 60 milliGRAM(s) Oral daily  pantoprazole  Injectable 40 milliGRAM(s) IV Push every 12 hours  traZODone 50 milliGRAM(s) Oral at bedtime    MEDICATIONS  (PRN):  acetaminophen     Tablet .. 650 milliGRAM(s) Oral every 6 hours PRN Temp greater or equal to 38C (100.4F), Mild Pain (1 - 3)      CAPILLARY BLOOD GLUCOSE        I&O's Summary      PHYSICAL EXAM:  Vital Signs Last 24 Hrs  T(C): 36.7 (15 Sree 2023 04:40), Max: 37 (14 Jun 2023 15:11)  T(F): 98 (15 Sree 2023 04:40), Max: 98.6 (14 Jun 2023 15:11)  HR: 78 (15 Sree 2023 04:40) (69 - 95)  BP: 117/69 (15 Sree 2023 04:40) (117/69 - 134/82)  BP(mean): --  RR: 18 (15 Sree 2023 04:40) (16 - 20)  SpO2: 95% (15 Sree 2023 04:40) (95% - 100%)    Parameters below as of 15 Sree 2023 04:40  Patient On (Oxygen Delivery Method): room air        CONSTITUTIONAL: NAD, well-developed  RESPIRATORY: Normal respiratory effort; lungs are clear to auscultation bilaterally  CARDIOVASCULAR: Regular rate and rhythm, normal S1 and S2, no murmurs  ABDOMEN: Nontender to palpation, normoactive bowel sounds, no rebound/guarding; No hepatosplenomegaly  MUSCLOSKELETAL: no clubbing or cyanosis of digits; no joint swelling or tenderness to palpation  PSYCH: A+O to person, place, and time; affect appropriate    LABS:                        7.5    9.92  )-----------( 340      ( 15 Sree 2023 06:55 )             23.4     06-15    140  |  104  |  11  ----------------------------<  103<H>  3.9   |  26  |  0.71    Ca    9.1      15 Sree 2023 06:49  Phos  3.4     06-15  Mg     2.3     06-15    TPro  6.1  /  Alb  4.0  /  TBili  0.3  /  DBili  x   /  AST  31  /  ALT  25  /  AlkPhos  64  06-15    PT/INR - ( 14 Jun 2023 19:47 )   PT: 11.0 sec;   INR: 0.96 ratio         PTT - ( 14 Jun 2023 19:47 )  PTT:28.0 sec

## 2023-06-15 NOTE — DISCHARGE NOTE PROVIDER - ATTENDING ATTESTATION STATEMENT
[facility] PREOPERATIVE PATIENT INSTRUCTIONS     PARKING:     [facility]: Parking is available I [address] on [address] and [facility]. Pedestrian Walkway bridge is located on the 2nd floor of Parking [address].  is also available at main entrance of Outpatient Pavilion on [address]         GENERAL INSTRUCTIONS:    • One family members/friend who is over the age of 18 may accompany you. A responsible person MUST accompany you home and stay with you the first 24 hours after surgery. Your surgery will be cancelled if these arrangements have not been made.   • If you become ill prior to surgery (I.e. fever, vomiting), please notify your surgeon immediately.   • Children under 12 years old MUST have a parent with them at all times.   • ON DAY OF SURGERY: Do not wear contact lenses, make up, nail polish or jewelry. Leave all valuables at home except what you will need or are instructed to bring.   • For your convenience, [facility] pharmacy is available to fill medications. Please bring a form of payments accepted at [facility] locations.       BRING WITH YOU ON DAY OF SURGERY:  1. Insurance Card and referral (if required), 's license or photo ID  2. All your medications in their original pharmacy bottles  3. Advance directive (living will, Healthcare Power of )  4. All information on your pacemaker or AICD, if appropriate  5. Any other forms, x-rays or films the doctor may have given you  6. Any medical devices (I.e. crutches, braces, sling)  7. Loose fitting clothing, slippers, walking shoes if applicable  8. For comfort measures, you may bring headphones/music device/magazines that can be given to family when you go into the operating room  9. Favorite toy of blanket for children. Formula for feeding after surgery or favorite sippy cup.   10. Please be aware that there are 2 different locations. Hospital patients please arrive at Entrance F on [address] &  [facility] and check in with .         [facility] patients please check in with  Desk right off the 2nd floor Pedestrian walkway entrance.     NPO/Eating/Drinking Restrictions: Please note: If these guidelines are not followed, your procedure will be delayed and possibly cancelled.    Do NOT eat or consume any food or dairy after midnight.  This includes candy or gum.    Acceptable clear liquids can be given 1 hour prior to arrival.  **Acceptable clear liquids: water, high-carbohydrate drink (Ensure Clear Pre-Surgical Drink, Propel; or Clear non-colored Gatorade), apple juice without fiber (non-organic), Pedialyte, 7-up/Sprite, black coffee or tea without milk products or lemon  Unacceptable clear liquids: Coffee or tea with milk products, orange juice, alcohol, soup broth    Do NOT smoke, drink alcohol, or use recreational drugs prior to your surgery.      Please call the location of surgery with questions: Pavilion (up to  6 PM) [phone]; Hospital (up to 7 PM) [phone].   After hours for both locations: [phone]    This information has been reviewed with the patient  on 11/23/21 10:25 AM.      I have personally seen and examined the patient. I have collaborated with and supervised the

## 2023-06-15 NOTE — CONSULT NOTE ADULT - ASSESSMENT
Impression:     #UGIB - likely upper GI bleed likely in setting of Excedrin, ddx includes PUD, esophagitis/gastritis/duodenitis; less likely is masses or dieulafoy lesion. No evidence of portal HTN to suggest varcies/portal HTN gastropathy as etiology.   - no previous endoscopy   - colonoscopy previously normal    Recommendations:   - plan for EGD today   - IV PPI BID   - avoid NSAIDs if possible   - rest of care per primary team    All recommendations are tentative until note is attested by attending.     Marisa Dykes, PGY-4   Gastroenterology/Hepatology Fellow  Available on [employer] Teams  [phone] ([facility] Short Range Pager)  [phone] ([facility] Long Range Pager)    After 5pm, please contact the on-call GI fellow. [phone]

## 2023-06-17 ENCOUNTER — NON-APPOINTMENT (OUTPATIENT)
Age: 63
End: 2023-06-17

## 2023-06-17 ENCOUNTER — INPATIENT (INPATIENT)
Facility: HOSPITAL | Age: 63
LOS: 0 days | Discharge: HOME | DRG: 812 | End: 2023-06-18
Attending: STUDENT IN AN ORGANIZED HEALTH CARE EDUCATION/TRAINING PROGRAM | Admitting: STUDENT IN AN ORGANIZED HEALTH CARE EDUCATION/TRAINING PROGRAM
Payer: COMMERCIAL

## 2023-06-17 VITALS
TEMPERATURE: 98 F | OXYGEN SATURATION: 97 % | RESPIRATION RATE: 18 BRPM | DIASTOLIC BLOOD PRESSURE: 78 MMHG | WEIGHT: 164.91 LBS | SYSTOLIC BLOOD PRESSURE: 136 MMHG | HEIGHT: 67 IN | HEART RATE: 18 BPM

## 2023-06-17 DIAGNOSIS — Z90.49 ACQUIRED ABSENCE OF OTHER SPECIFIED PARTS OF DIGESTIVE TRACT: Chronic | ICD-10-CM

## 2023-06-17 DIAGNOSIS — D64.9 ANEMIA, UNSPECIFIED: ICD-10-CM

## 2023-06-17 DIAGNOSIS — Z29.9 ENCOUNTER FOR PROPHYLACTIC MEASURES, UNSPECIFIED: ICD-10-CM

## 2023-06-17 DIAGNOSIS — I10 ESSENTIAL (PRIMARY) HYPERTENSION: ICD-10-CM

## 2023-06-17 DIAGNOSIS — F41.9 ANXIETY DISORDER, UNSPECIFIED: ICD-10-CM

## 2023-06-17 LAB
ABO RH CONFIRMATION: SIGNIFICANT CHANGE UP
ALBUMIN SERPL ELPH-MCNC: 3 G/DL — LOW (ref 3.3–5)
ALP SERPL-CCNC: 74 U/L — SIGNIFICANT CHANGE UP (ref 40–120)
ALT FLD-CCNC: 38 U/L — SIGNIFICANT CHANGE UP (ref 12–78)
ANION GAP SERPL CALC-SCNC: 4 MMOL/L — LOW (ref 5–17)
APTT BLD: 26.8 SEC — LOW (ref 27.5–35.5)
AST SERPL-CCNC: 34 U/L — SIGNIFICANT CHANGE UP (ref 15–37)
BASOPHILS # BLD AUTO: 0.03 K/UL — SIGNIFICANT CHANGE UP (ref 0–0.2)
BASOPHILS NFR BLD AUTO: 0.4 % — SIGNIFICANT CHANGE UP (ref 0–2)
BILIRUB SERPL-MCNC: 0.2 MG/DL — SIGNIFICANT CHANGE UP (ref 0.2–1.2)
BUN SERPL-MCNC: 22 MG/DL — SIGNIFICANT CHANGE UP (ref 7–23)
CALCIUM SERPL-MCNC: 8.9 MG/DL — SIGNIFICANT CHANGE UP (ref 8.5–10.1)
CHLORIDE SERPL-SCNC: 109 MMOL/L — HIGH (ref 96–108)
CO2 SERPL-SCNC: 27 MMOL/L — SIGNIFICANT CHANGE UP (ref 22–31)
CREAT SERPL-MCNC: 0.8 MG/DL — SIGNIFICANT CHANGE UP (ref 0.5–1.3)
EGFR: 82 ML/MIN/1.73M2 — SIGNIFICANT CHANGE UP
EOSINOPHIL # BLD AUTO: 0.29 K/UL — SIGNIFICANT CHANGE UP (ref 0–0.5)
EOSINOPHIL NFR BLD AUTO: 3.8 % — SIGNIFICANT CHANGE UP (ref 0–6)
GLUCOSE SERPL-MCNC: 104 MG/DL — HIGH (ref 70–99)
HCT VFR BLD CALC: 23.8 % — LOW (ref 34.5–45)
HGB BLD-MCNC: 7.6 G/DL — LOW (ref 11.5–15.5)
IMM GRANULOCYTES NFR BLD AUTO: 0.4 % — SIGNIFICANT CHANGE UP (ref 0–0.9)
INR BLD: 0.94 RATIO — SIGNIFICANT CHANGE UP (ref 0.88–1.16)
LIDOCAIN IGE QN: 206 U/L — SIGNIFICANT CHANGE UP (ref 73–393)
LYMPHOCYTES # BLD AUTO: 2.18 K/UL — SIGNIFICANT CHANGE UP (ref 1–3.3)
LYMPHOCYTES # BLD AUTO: 28.4 % — SIGNIFICANT CHANGE UP (ref 13–44)
MCHC RBC-ENTMCNC: 31.9 GM/DL — LOW (ref 32–36)
MCHC RBC-ENTMCNC: 32.2 PG — SIGNIFICANT CHANGE UP (ref 27–34)
MCV RBC AUTO: 100.8 FL — HIGH (ref 80–100)
MONOCYTES # BLD AUTO: 0.75 K/UL — SIGNIFICANT CHANGE UP (ref 0–0.9)
MONOCYTES NFR BLD AUTO: 9.8 % — SIGNIFICANT CHANGE UP (ref 2–14)
NEUTROPHILS # BLD AUTO: 4.4 K/UL — SIGNIFICANT CHANGE UP (ref 1.8–7.4)
NEUTROPHILS NFR BLD AUTO: 57.2 % — SIGNIFICANT CHANGE UP (ref 43–77)
NRBC # BLD: 0 /100 WBCS — SIGNIFICANT CHANGE UP (ref 0–0)
PLATELET # BLD AUTO: 384 K/UL — SIGNIFICANT CHANGE UP (ref 150–400)
POTASSIUM SERPL-MCNC: 4.1 MMOL/L — SIGNIFICANT CHANGE UP (ref 3.5–5.3)
POTASSIUM SERPL-SCNC: 4.1 MMOL/L — SIGNIFICANT CHANGE UP (ref 3.5–5.3)
PROT SERPL-MCNC: 6.2 G/DL — SIGNIFICANT CHANGE UP (ref 6–8.3)
PROTHROM AB SERPL-ACNC: 11 SEC — SIGNIFICANT CHANGE UP (ref 10.5–13.4)
RBC # BLD: 2.36 M/UL — LOW (ref 3.8–5.2)
RBC # FLD: 19.3 % — HIGH (ref 10.3–14.5)
SODIUM SERPL-SCNC: 140 MMOL/L — SIGNIFICANT CHANGE UP (ref 135–145)
TROPONIN I, HIGH SENSITIVITY RESULT: 5.9 NG/L — SIGNIFICANT CHANGE UP
WBC # BLD: 7.68 K/UL — SIGNIFICANT CHANGE UP (ref 3.8–10.5)
WBC # FLD AUTO: 7.68 K/UL — SIGNIFICANT CHANGE UP (ref 3.8–10.5)

## 2023-06-17 PROCEDURE — 74174 CTA ABD&PLVS W/CONTRAST: CPT | Mod: 26,MA

## 2023-06-17 PROCEDURE — 93010 ELECTROCARDIOGRAM REPORT: CPT

## 2023-06-17 PROCEDURE — 99285 EMERGENCY DEPT VISIT HI MDM: CPT | Mod: FS

## 2023-06-17 PROCEDURE — 99223 1ST HOSP IP/OBS HIGH 75: CPT | Mod: GC,AI

## 2023-06-17 RX ORDER — DULOXETINE HYDROCHLORIDE 30 MG/1
60 CAPSULE, DELAYED RELEASE ORAL DAILY
Refills: 0 | Status: DISCONTINUED | OUTPATIENT
Start: 2023-06-17 | End: 2023-06-18

## 2023-06-17 RX ORDER — LANOLIN ALCOHOL/MO/W.PET/CERES
3 CREAM (GRAM) TOPICAL AT BEDTIME
Refills: 0 | Status: DISCONTINUED | OUTPATIENT
Start: 2023-06-17 | End: 2023-06-18

## 2023-06-17 RX ORDER — TRAZODONE HCL 50 MG
0 TABLET ORAL
Refills: 0 | DISCHARGE

## 2023-06-17 RX ORDER — PANTOPRAZOLE SODIUM 20 MG/1
40 TABLET, DELAYED RELEASE ORAL
Refills: 0 | Status: DISCONTINUED | OUTPATIENT
Start: 2023-06-17 | End: 2023-06-18

## 2023-06-17 RX ORDER — PANTOPRAZOLE SODIUM 20 MG/1
40 TABLET, DELAYED RELEASE ORAL ONCE
Refills: 0 | Status: COMPLETED | OUTPATIENT
Start: 2023-06-17 | End: 2023-06-17

## 2023-06-17 RX ORDER — DULOXETINE HYDROCHLORIDE 30 MG/1
1 CAPSULE, DELAYED RELEASE ORAL
Refills: 0 | DISCHARGE

## 2023-06-17 RX ORDER — ONDANSETRON 8 MG/1
4 TABLET, FILM COATED ORAL EVERY 8 HOURS
Refills: 0 | Status: DISCONTINUED | OUTPATIENT
Start: 2023-06-17 | End: 2023-06-18

## 2023-06-17 RX ORDER — TRAZODONE HCL 50 MG
50 TABLET ORAL AT BEDTIME
Refills: 0 | Status: DISCONTINUED | OUTPATIENT
Start: 2023-06-17 | End: 2023-06-18

## 2023-06-17 RX ORDER — ACETAMINOPHEN 500 MG
650 TABLET ORAL EVERY 6 HOURS
Refills: 0 | Status: DISCONTINUED | OUTPATIENT
Start: 2023-06-17 | End: 2023-06-18

## 2023-06-17 RX ORDER — SODIUM CHLORIDE 9 MG/ML
1000 INJECTION INTRAMUSCULAR; INTRAVENOUS; SUBCUTANEOUS ONCE
Refills: 0 | Status: COMPLETED | OUTPATIENT
Start: 2023-06-17 | End: 2023-06-17

## 2023-06-17 RX ADMIN — Medication 650 MILLIGRAM(S): at 23:45

## 2023-06-17 RX ADMIN — Medication 650 MILLIGRAM(S): at 22:45

## 2023-06-17 RX ADMIN — PANTOPRAZOLE SODIUM 40 MILLIGRAM(S): 20 TABLET, DELAYED RELEASE ORAL at 18:30

## 2023-06-17 RX ADMIN — SODIUM CHLORIDE 1000 MILLILITER(S): 9 INJECTION INTRAMUSCULAR; INTRAVENOUS; SUBCUTANEOUS at 18:30

## 2023-06-17 NOTE — H&P ADULT - NSHPPHYSICALEXAM_GEN_ALL_CORE
GENERAL:  Not in acute distress, lying in bed comfortably  HEENT:  PERRLA  CHEST:  CTAB  HEART:  Regular rate and rhythm, no murmurs, rubs, gallops  ABDOMEN:  Soft, non-tender, non-distended,  EXTREMITIES:  no cyanosis, no LE edema  SKIN:  Warm, well perfused  NEURO:  Alert, Conversing appropriately, A&Ox3  PSYCH: Not emotionally labile, appropriate affect GENERAL:  Not in acute distress, lying in bed comfortably  HEENT:  PERRLA  CHEST:  CTAB  HEART:  Regular rate and rhythm, no murmurs, rubs, gallops  ABDOMEN:  Soft, non-tender, non-distended,  EXTREMITIES:  no cyanosis, no LE edema  SKIN:  Warm, well perfused  NEURO:  Alert, Conversing appropriately, A&Ox3  PSYCH: Not emotionally labile, appropriate affect    Vital Signs Last 24 Hrs  T(C): 36.7 (17 Jun 2023 16:29), Max: 36.7 (17 Jun 2023 16:29)  T(F): 98 (17 Jun 2023 16:29), Max: 98 (17 Jun 2023 16:29)  HR: 18 (17 Jun 2023 16:29) (18 - 18)  BP: 136/78 (17 Jun 2023 16:29) (136/78 - 136/78)  BP(mean): --  RR: 18 (17 Jun 2023 16:29) (18 - 18)  SpO2: 97% (17 Jun 2023 16:29) (97% - 97%)    Parameters below as of 17 Jun 2023 16:29  Patient On (Oxygen Delivery Method): room air

## 2023-06-17 NOTE — H&P ADULT - ASSESSMENT
Patient is a 63-year-old female with pmhx of anxiety HTN anemia asthma migraine headache discharged from [place] 2 days ago for upper GI bleed (non-bleeding gastric ulcers on endoscopy) with anemia coming in for continued dizziness.  Likely 2/2 symptomatic anemia.

## 2023-06-17 NOTE — ED PROVIDER NOTE - OBJECTIVE STATEMENT
Patient is a 63-year-old female with pmhx of anxiety HTN anemia asthma migraine headache discharged from [place] 2 days ago for upper GI bleed with anemia coming in for continued dizziness.  Patient states dizziness described as lightheadedness worse when she goes from sitting to standing.  Patient denies any dark stools.  Patient states having some abdominal bloating which she was having prior as well.  Patient states she had an endoscopy and cauterization received 1 unit of blood and was discharged.  Patient also complaining of shortness of breath with exertion denies any chest pain blurry vision numbness tingling weakness.

## 2023-06-17 NOTE — H&P ADULT - HISTORY OF PRESENT ILLNESS
Patient is a 63-year-old female with pmhx of anxiety HTN anemia asthma migraine headache discharged from [place] 2 days ago for upper GI bleed (non-bleeding gastric ulcers on endoscopy) with anemia coming in for continued dizziness.  Patient states dizziness described as lightheadedness worse when she goes from sitting to standing.  Patient denies any dark stools.  Patient states having some abdominal bloating which she was having prior as well.  Patient states she had an endoscopy and cauterization received 1 unit of blood and was discharged.  Patient also complaining of shortness of breath with exertion denies any chest pain blurry vision numbness tingling weakness.    IN THE ED:  Temp XX F, HR XX, BP XX/XX, RR XX, XX SpO2 on room air  Labs significant for:  Imaging  CXR:  EKG:  Received in ED:  Consults:  Patient is a 63-year-old female with pmhx of anxiety HTN anemia asthma migraine headache discharged from [place] 2 days ago for upper GI bleed (non-bleeding gastric ulcers on endoscopy) with anemia coming in for continued dizziness.  Patient states dizziness described as lightheadedness worse when she goes from sitting to standing.  Patient denies any dark stools.  Patient states having some abdominal bloating which she was having prior as well.  Patient states she had an endoscopy and cauterization received 1 unit of blood and was discharged.  Patient also complaining of shortness of breath with exertion denies any chest pain blurry vision numbness tingling weakness.    IN THE ED:  Temp 98F, HR 82, /78, RR 18, 97 SpO2 on room air  Labs significant for: Hb 7.6,   Imaging  CT angio AP: No acute pathology. No active bleeding.  EKG: NSR 79 BPM  Received in ED: Ordered: 1L NS, IV pantoprazole 40mg, 1U pRBCs  Consults: GI Patient is a 63-year-old female with pmhx of anxiety HTN anemia asthma migraine headache discharged from [place] 2 days ago for upper GI bleed (non-bleeding gastric ulcers on endoscopy) with anemia coming in for continued dizziness. Pt started feeling dizzy ~12 hours after discharge. Patient states dizziness described as lightheadedness worse when she goes from sitting to standing.  Patient denies any dark stools.  Patient states having some abdominal bloating which she was having prior as well.  Patient states she had an endoscopy and cauterization received 1 unit of blood and was discharged. Currently, pt is not dizzy at rest, but endorses dizziness with ~20 ft of ambulation.      IN THE ED:  Temp 98F, HR 82, /78, RR 18, 97 SpO2 on room air  Labs significant for: Hb 7.6,   Imaging  CT angio AP: No acute pathology. No active bleeding.  EKG: NSR 79 BPM  Received in ED: Ordered: 1L NS, IV pantoprazole 40mg, 1U pRBCs  Consults: GI  63-year-old female with pmhx of anxiety HTN anemia asthma migraine headache discharged from [place] 2 days ago for upper GI bleed (non-bleeding gastric ulcers on endoscopy) with anemia coming in for continued dizziness. Pt started feeling dizzy ~12 hours after discharge. Patient states dizziness described as lightheadedness worse when she goes from sitting to standing.  Patient denies any dark stools.  Patient states having some abdominal bloating which she was having prior as well.  Patient states she had an endoscopy and cauterization received 1 unit of blood and was discharged. Currently, pt is not dizzy at rest, but endorses dizziness with ~20 ft of ambulation.    IN THE ED:  Temp 98F, HR 82, /78, RR 18, 97 SpO2 on room air  Labs significant for: Hb 7.6,   Imaging  CT angio AP: No acute pathology. No active bleeding.  EKG: NSR 79 BPM  Received in ED: Ordered: 1L NS, IV pantoprazole 40mg, 1U pRBCs  Consults: GI

## 2023-06-17 NOTE — H&P ADULT - PROBLEM SELECTOR PLAN 1
Discharged from [place] 2 days ago for upper GI bleed (non-bleeding gastric ulcers on endoscopy). Pt states was dizzy at time of discharge.\  - CT angio AP: No acute pathology. No active bleeding.  - Received in ED: Ordered: 1L NS, IV pantoprazole 40mg, 1U pRBCs  - Hb 7.6 on admission, baseline possibly ~13  - Transfuse <8 OR if continues to be symptomatic  - Maintain active type and screen  - Continue IV protonix 40mg qD  - Continue XXX diet  - GI consulted Discharged from [place] 2 days ago for upper GI bleed (non-bleeding gastric ulcers on endoscopy). Pt states was dizzy at time of discharge.  - CT angio AP: No acute pathology. No active bleeding.  - Received in ED: Ordered: 1L NS, IV pantoprazole 40mg, 1U pRBCs  - Hb 7.6 on admission, discharged hb 7.9, baseline possibly ~13  - Transfuse <8 OR if continues to be symptomatic  - Maintain active type and screen  - Continue Protonix 40mg PO qD  - Pt discharge on regular diet, continue  - GI consulted Discharged from [place] 2 days ago for upper GI bleed (non-bleeding gastric ulcers on endoscopy)  - CT angio AP: No acute pathology. No active bleeding.  - Received in ED: Ordered: 1L NS, IV pantoprazole 40mg, 1U pRBCs  - Hb 7.6 on admission, discharged hb 7.9, baseline possibly ~13  - Orthostatics negative  - Transfuse <8 OR if continues to be symptomatic  - Maintain active type and screen  - Continue Protonix 40mg PO qD  - Pt discharge on regular diet, continue  - GI consulted

## 2023-06-17 NOTE — H&P ADULT - NSHPREVIEWOFSYSTEMS_GEN_ALL_CORE
CONSTITUTIONAL: No fever/chills.  HENMT: No HA, lightheadedness/dizziness at rest, but has dizziness when walkign ~20 feet.  RESPIRATORY: No cough, wheezing, hemoptysis; No shortness of breath.  CARDIOVASCULAR: No chest pain, palpitations.  GASTROINTESTINAL: +4/10 abdominal discomfort. No nausea or vomiting; No diarrhea or constipation.  GENITOURINARY: No dysuria, changes in frequency, hematuria, or incontinence  NEUROLOGICAL: Baseline strength. Sensation intact bilaterally.  MUSCULOSKELETAL: No joint pain; No muscle, back, or extremity pain

## 2023-06-17 NOTE — H&P ADULT - ATTENDING COMMENTS
63-year-old female with pmhx of anxiety HTN anemia asthma migraine headache discharged from [place] 2 days ago for upper GI bleed (non-bleeding gastric ulcers on endoscopy) with anemia coming in for continued dizziness.  Likely 2/2 symptomatic anemia.    Agree with above. Edited where appropriate

## 2023-06-17 NOTE — ED PROVIDER NOTE - CLINICAL SUMMARY MEDICAL DECISION MAKING FREE TEXT BOX
Patient who was recently admitted to [facility] for upper GI bleed with anemia discharged 2 days ago presenting to er complaining of dizziness/lightheadedness worse with positional changes and shortness of breath/dyspnea on exertion consistent with her recent anemia.  Patient relates while at [place] she had an EGD showing upper GI bleed was cauterized at [place].  No fever headache chest pain abdominal pain vomiting.    Plan EKG CTA abdomen pelvis labs orthostatics IV fluid Protonix possible transfusion    Differential including but not limited to upper GI bleed anemia arrhythmia MI electrolyte abnormality dehydration

## 2023-06-17 NOTE — H&P ADULT - NSHPSOCIALHISTORY_GEN_ALL_CORE
Lives at home with   Completes all ADLs independently  Ambulates without walker  Social Alcohol use  Denies smoking history  Denies drug use

## 2023-06-17 NOTE — H&P ADULT - PROBLEM SELECTOR PLAN 4
- Diet: clear liquid diet  - DVT: SCDs  - Dispo: Home. - DVT: SCDs  - Dispo: Home. - DVT: SCDs 2/2 gi bleed   - Dispo: Home.

## 2023-06-17 NOTE — H&P ADULT - PROBLEM SELECTOR PLAN 2
/78 on admission  - Currently off meds; previously on lisinopril which was switched amlodipine however developed LE swelling   - Monitor hemodynamics  - DASH/TLC diet

## 2023-06-17 NOTE — ED PROVIDER NOTE - DIFFERENTIAL DIAGNOSIS
Differential including but not limited to upper GI bleed anemia arrhythmia MI electrolyte abnormality dehydration Differential Diagnosis

## 2023-06-18 ENCOUNTER — TRANSCRIPTION ENCOUNTER (OUTPATIENT)
Age: 63
End: 2023-06-18

## 2023-06-18 VITALS
DIASTOLIC BLOOD PRESSURE: 76 MMHG | TEMPERATURE: 98 F | HEART RATE: 71 BPM | OXYGEN SATURATION: 94 % | RESPIRATION RATE: 19 BRPM | SYSTOLIC BLOOD PRESSURE: 116 MMHG

## 2023-06-18 LAB
ANION GAP SERPL CALC-SCNC: 3 MMOL/L — LOW (ref 5–17)
BUN SERPL-MCNC: 14 MG/DL — SIGNIFICANT CHANGE UP (ref 7–23)
CALCIUM SERPL-MCNC: 9.1 MG/DL — SIGNIFICANT CHANGE UP (ref 8.5–10.1)
CHLORIDE SERPL-SCNC: 108 MMOL/L — SIGNIFICANT CHANGE UP (ref 96–108)
CO2 SERPL-SCNC: 30 MMOL/L — SIGNIFICANT CHANGE UP (ref 22–31)
CREAT SERPL-MCNC: 0.76 MG/DL — SIGNIFICANT CHANGE UP (ref 0.5–1.3)
EGFR: 87 ML/MIN/1.73M2 — SIGNIFICANT CHANGE UP
GLUCOSE SERPL-MCNC: 105 MG/DL — HIGH (ref 70–99)
HCT VFR BLD CALC: 27.4 % — LOW (ref 34.5–45)
HCV AB S/CO SERPL IA: 0.11 S/CO — SIGNIFICANT CHANGE UP (ref 0–0.99)
HCV AB SERPL-IMP: SIGNIFICANT CHANGE UP
HGB BLD-MCNC: 9.1 G/DL — LOW (ref 11.5–15.5)
MCHC RBC-ENTMCNC: 31.9 PG — SIGNIFICANT CHANGE UP (ref 27–34)
MCHC RBC-ENTMCNC: 33.2 GM/DL — SIGNIFICANT CHANGE UP (ref 32–36)
MCV RBC AUTO: 96.1 FL — SIGNIFICANT CHANGE UP (ref 80–100)
NRBC # BLD: 0 /100 WBCS — SIGNIFICANT CHANGE UP (ref 0–0)
PLATELET # BLD AUTO: 397 K/UL — SIGNIFICANT CHANGE UP (ref 150–400)
POTASSIUM SERPL-MCNC: 4.5 MMOL/L — SIGNIFICANT CHANGE UP (ref 3.5–5.3)
POTASSIUM SERPL-SCNC: 4.5 MMOL/L — SIGNIFICANT CHANGE UP (ref 3.5–5.3)
RBC # BLD: 2.85 M/UL — LOW (ref 3.8–5.2)
RBC # FLD: 19.3 % — HIGH (ref 10.3–14.5)
SODIUM SERPL-SCNC: 141 MMOL/L — SIGNIFICANT CHANGE UP (ref 135–145)
WBC # BLD: 6.63 K/UL — SIGNIFICANT CHANGE UP (ref 3.8–10.5)
WBC # FLD AUTO: 6.63 K/UL — SIGNIFICANT CHANGE UP (ref 3.8–10.5)

## 2023-06-18 PROCEDURE — 85025 COMPLETE CBC W/AUTO DIFF WBC: CPT

## 2023-06-18 PROCEDURE — 80048 BASIC METABOLIC PNL TOTAL CA: CPT

## 2023-06-18 PROCEDURE — 36415 COLL VENOUS BLD VENIPUNCTURE: CPT

## 2023-06-18 PROCEDURE — 86803 HEPATITIS C AB TEST: CPT

## 2023-06-18 PROCEDURE — 86900 BLOOD TYPING SEROLOGIC ABO: CPT

## 2023-06-18 PROCEDURE — 80053 COMPREHEN METABOLIC PANEL: CPT

## 2023-06-18 PROCEDURE — 36430 TRANSFUSION BLD/BLD COMPNT: CPT

## 2023-06-18 PROCEDURE — 74174 CTA ABD&PLVS W/CONTRAST: CPT | Mod: MA

## 2023-06-18 PROCEDURE — 85027 COMPLETE CBC AUTOMATED: CPT

## 2023-06-18 PROCEDURE — 99239 HOSP IP/OBS DSCHRG MGMT >30: CPT

## 2023-06-18 PROCEDURE — 83690 ASSAY OF LIPASE: CPT

## 2023-06-18 PROCEDURE — 93005 ELECTROCARDIOGRAM TRACING: CPT

## 2023-06-18 PROCEDURE — P9016: CPT

## 2023-06-18 PROCEDURE — 86901 BLOOD TYPING SEROLOGIC RH(D): CPT

## 2023-06-18 PROCEDURE — 96374 THER/PROPH/DIAG INJ IV PUSH: CPT

## 2023-06-18 PROCEDURE — 99285 EMERGENCY DEPT VISIT HI MDM: CPT

## 2023-06-18 PROCEDURE — 84484 ASSAY OF TROPONIN QUANT: CPT

## 2023-06-18 PROCEDURE — 86923 COMPATIBILITY TEST ELECTRIC: CPT

## 2023-06-18 PROCEDURE — 85610 PROTHROMBIN TIME: CPT

## 2023-06-18 PROCEDURE — 85730 THROMBOPLASTIN TIME PARTIAL: CPT

## 2023-06-18 PROCEDURE — 86850 RBC ANTIBODY SCREEN: CPT

## 2023-06-18 RX ORDER — PANTOPRAZOLE SODIUM 20 MG/1
1 TABLET, DELAYED RELEASE ORAL
Qty: 0 | Refills: 0 | DISCHARGE

## 2023-06-18 RX ADMIN — PANTOPRAZOLE SODIUM 40 MILLIGRAM(S): 20 TABLET, DELAYED RELEASE ORAL at 05:36

## 2023-06-18 RX ADMIN — DULOXETINE HYDROCHLORIDE 60 MILLIGRAM(S): 30 CAPSULE, DELAYED RELEASE ORAL at 11:04

## 2023-06-18 RX ADMIN — Medication 650 MILLIGRAM(S): at 08:13

## 2023-06-18 RX ADMIN — Medication 650 MILLIGRAM(S): at 07:13

## 2023-06-18 NOTE — DISCHARGE NOTE PROVIDER - NSDCFUSCHEDAPPT_GEN_ALL_CORE_FT
Devika Amador  [facility]  P[facility] [id number]  Scheduled Appointment: 06/26/2023

## 2023-06-18 NOTE — CARE COORDINATION ASSESSMENT. - NSCAREPROVIDERS_GEN_ALL_CORE_FT
CARE PROVIDERS:  Accepting Physician: Beulah Hilario  Admitting: Beulah Hilario  Attending: Beulah Hilario  Case Management: Behringer, Megan  Consultant: Javi Cassidy  Covering Team: Ramu Hernández  ED ACP: Lucas Erwin  ED Attending: Frank Mix  ED Nurse: Vito Ibrahim  Nurse: Mary Gonsalves  Nurse: Silvia Sylvester  Ordered: ADM, User  Override: Silvia Sylvester  PCA/Nursing Assistant: Page Barnett  Primary Team: Beulah Hilario  Primary Team: Moon Cuadra  Registered Dietitian: Kalpana Beal  Respiratory Therapy: Romi Whitfield

## 2023-06-18 NOTE — DISCHARGE NOTE PROVIDER - HOSPITAL COURSE
63-year-old female with pmhx of anxiety HTN anemia asthma migraine headache discharged from [place] 2 days ago for upper GI bleed (non-bleeding gastric ulcers on endoscopy) with   anemia coming in for continued dizziness.  Likely 2/2 symptomatic anemia.   Discharged from [place] 2 days ago for upper GI bleed (non-bleeding gastric ulcers on endoscopy) ,  CT angio AP: No acute pathology. No active bleeding.  - on  pantoprazole  ivpb 40mg  s/p 1U pRBCs , stable hb 9.1 ,  Orthostatics negative  Transfuse <8 OR if continues to be symptomatic and  Maintain active type and screen  no acute gi bleed , hb  remain stable . GI DR SHEIKH puri to [place] home.    medically stable 6/18/23  Vital Signs Last 24 Hrs  T(C): 36.6 (18 Jun 2023 05:10), Max: 36.8 (17 Jun 2023 23:10)  T(F): 97.9 (18 Jun 2023 05:10), Max: 98.2 (17 Jun 2023 23:10)  HR: 69 (18 Jun 2023 05:10) (18 - 89)  BP: 120/73 (18 Jun 2023 05:10) (120/73 - 136/78)  BP(mean): --  RR: 18 (18 Jun 2023 05:10) (18 - 18)  SpO2: 95% (18 Jun 2023 05:10) (95% - 97%)    Parameters below as of 18 Jun 2023 05:10  Patient On (Oxygen Delivery Method): room air    Physical Exam: GENERAL:  Not in acute distress,  HEENT:  PERRLA  CHEST:  CT air bl wheezing , no rale   HEART:  Regular rate and rhythm, no murmurs, NO TACHY   ABDOMEN:  Soft, non-tender, non-distended,  EXTREMITIES:  no cyanosis, no LE edema  SKIN:  Warm, no rash   NEURO: , A&Ox3 MOTOR /SENSORY INTACT  gu intact  total spend 40 min.

## 2023-06-18 NOTE — ED ADULT NURSE NOTE - CHIEF COMPLAINT
[facility]  [address]  [place] [address]      January 17, 2019      RE: Adilson Thompson  [address]  [address]   [facility] [address]       To whom it may concern:    Adilson Thompson was seen in our clinic today. Labs drawn at this appointment.      Sincerely,      Jaylin MARI          
The patient is a 63y Female complaining of abdominal pain.

## 2023-06-18 NOTE — DISCHARGE NOTE PROVIDER - NSDCCPCAREPLAN_GEN_ALL_CORE_FT
PRINCIPAL DISCHARGE DIAGNOSIS  Diagnosis: Anemia due to GI blood loss  Assessment and Plan of Treatment: hx endoscopy  you found to have  dudenal ulcer - you recevied 1 unit prbc  - hb stable  9.1 , continue home antacid pantaprazole 40 mg po daily.      SECONDARY DISCHARGE DIAGNOSES  Diagnosis: HTN (hypertension)  Assessment and Plan of Treatment: continue home meds

## 2023-06-18 NOTE — CONSULT NOTE ADULT - SUBJECTIVE AND OBJECTIVE BOX
[facility]  Earl Blackmon PA-C  [address] [place] [id number] [phone]      Chief Complaint:  Patient is a 63y old  Female who presents with a chief complaint of Symptomatic Anemia (2023 21:38)      HPI: 63-year-old female with pmhx of anxiety HTN anemia asthma migraine headache discharged from [facility] 2 days ago for upper GI bleed (non-bleeding gastric ulcers on endoscopy) with anemia coming in for continued dizziness. Pt started feeling dizzy ~12 hours after discharge. Patient states dizziness described as lightheadedness worse when she goes from sitting to standing.  Patient denies any dark stools.  Patient states having some abdominal bloating which she was having prior as well.  Patient states she had an endoscopy and cauterization received 1 unit of blood and was discharged. Currently, pt is not dizzy at rest, but endorses dizziness with ~20 ft of ambulation.    Allergies:  penicillin (Hives)  Allergic to melons (Rash)      Medications:  acetaminophen     Tablet .. 650 milliGRAM(s) Oral every 6 hours PRN  aluminum hydroxide/magnesium hydroxide/simethicone Suspension 30 milliLiter(s) Oral every 4 hours PRN  DULoxetine 60 milliGRAM(s) Oral daily  melatonin 3 milliGRAM(s) Oral at bedtime PRN  ondansetron Injectable 4 milliGRAM(s) IV Push every 8 hours PRN  pantoprazole    Tablet 40 milliGRAM(s) Oral before breakfast  traZODone 50 milliGRAM(s) Oral at bedtime      PMHX/PSHX:  GI bleed    HTN (hypertension)    Anxiety    Migraine    S/P appendectomy        Family history:  No pertinent family history in first degree relatives        Social History:     ROS:     General:  no fevers, chills, night sweats, fatigue,   Eyes:  Good vision, no reported pain  ENT:  No sore throat, pain, runny nose, dysphagia  CV:  No pain, palpitations, hypo/hypertension  Resp:  No dyspnea, cough, tachypnea, wheezing  GI:  No pain, No nausea, No vomiting, No diarrhea, No constipation, No weight loss, No fever, No pruritis, No rectal bleeding, No tarry stools, No dysphagia,  :  No pain, bleeding, incontinence, nocturia  Muscle:  No pain, weakness  Neuro:  No weakness, tingling, memory problems  Psych:  No fatigue, insomnia, mood problems, depression  Endocrine:  No polyuria, polydipsia, cold/heat intolerance  Heme:  No petechiae, ecchymosis, easy bruisability  Skin:  No rash, tattoos, scars, edema      PHYSICAL EXAM:   Vital Signs:  Vital Signs Last 24 Hrs  T(C): 36.6 (2023 05:10), Max: 36.8 (2023 23:10)  T(F): 97.9 (2023 05:10), Max: 98.2 (2023 23:10)  HR: 69 (2023 05:10) (18 - 89)  BP: 120/73 (2023 05:10) (120/73 - 136/78)  BP(mean): --  RR: 18 (2023 05:10) (18 - 18)  SpO2: 95% (2023 05:10) (95% - 97%)    Parameters below as of 2023 05:10  Patient On (Oxygen Delivery Method): room air      Daily Height in cm: 170.18 (2023 16:29)    Daily Weight in k (2023 05:10)    GENERAL:  Appears stated age,   HEENT:  NC/AT,    CHEST:  Full & symmetric excursion,   HEART:  Regular rhythm  ABDOMEN:  Soft, non-tender, non-distended,   EXTEREMITIES:  no cyanosis,clubbing or edema  SKIN:  No rash  NEURO:  Alert,    LABS:                        9.1    6.63  )-----------( 397      ( 2023 06:03 )             27.4     -18    141  |  108  |  14  ----------------------------<  105<H>  4.5   |  30  |  0.76    Ca    9.1      2023 06:03    TPro  6.2  /  Alb  3.0<L>  /  TBili  0.2  /  DBili  x   /  AST  34  /  ALT  38  /  AlkPhos  74  -17    LIVER FUNCTIONS - ( 2023 17:55 )  Alb: 3.0 g/dL / Pro: 6.2 g/dL / ALK PHOS: 74 U/L / ALT: 38 U/L / AST: 34 U/L / GGT: x           PT/INR - ( 2023 17:55 )   PT: 11.0 sec;   INR: 0.94 ratio         PTT - ( 2023 17:55 )  PTT:26.8 sec    Amylase Serum--      Lipase [id number]       Ammonia--      Imaging:

## 2023-06-18 NOTE — ED ADULT NURSE NOTE - NSSUHOSCREENINGYN_ED_ALL_ED
Continue to optimize nutrition via tolerated route. Composition & rate of TPN adjusted daily per medical team. Wean as feasible with advancing feeds
Yes - the patient is able to be screened

## 2023-06-18 NOTE — DISCHARGE NOTE PROVIDER - NSDCMRMEDTOKEN_GEN_ALL_CORE_FT
DULoxetine 60 mg oral delayed release capsule: 1 orally once a day (at bedtime)  pantoprazole 40 mg oral delayed release tablet: 1 tab(s) orally 2 times a day  traZODone 50 mg oral tablet: orally once a day (at bedtime)

## 2023-06-18 NOTE — DISCHARGE NOTE PROVIDER - CARE PROVIDER_API CALL
Javi Cassidy  Gastroenterology  [address] [place] [id number]  Phone: [phone]  Fax: [phone]  Follow Up Time:

## 2023-06-18 NOTE — CARE COORDINATION ASSESSMENT. - NSPASTMEDSURGHISTORY_GEN_ALL_CORE_FT
PAST MEDICAL & SURGICAL HISTORY:  Migraine      Anxiety      HTN (hypertension)      GI bleed      S/P appendectomy

## 2023-06-18 NOTE — CARE COORDINATION ASSESSMENT. - OTHER PERTINENT DISCHARGE PLANNING INFORMATION:
63-year-old female with pmhx of anxiety HTN anemia asthma migraine headache discharged from [facility] 2 days ago for upper GI bleed (non-bleeding gastric ulcers on endoscopy) with anemia coming in for continued dizziness.  Likely 2/2 symptomatic anemia. Patient from home with spouse, ind. Met patient at bedside.  Explained role of CM, verbalized understanding. Pt was made aware a CM will remain available through hospitalization.  Contact information given in discharge/ transitions resource folder.

## 2023-06-18 NOTE — CONSULT NOTE ADULT - ASSESSMENT
anemia  gastric ulcer    patient initially orthostatic from volume depletion  symptoms resolved after prbc  tolerating diet  brown stool  proton pump inhibitor daily  ok to [place] home with outpatient follow up with primary GI  d/ wpatient

## 2023-06-18 NOTE — ED ADULT NURSE NOTE - OBJECTIVE STATEMENT
64 y/o female received alert and oriented x4. C/o abdominal pain and anemia. Denies any cp, sob, n/v/d, headache, fever/chills. Respirations even and unlabored

## 2023-06-18 NOTE — ED ADULT NURSE NOTE - TEMPLATE LIST FOR HEAD TO TOE ASSESSMENT
General Subsequent Stages Histo Method Verbiage: Using a similar technique to that described above, a thin layer of tissue was removed from all areas where tumor was visible on the previous stage.  The tissue was again oriented, mapped, dyed, and processed as above.

## 2023-06-18 NOTE — DISCHARGE NOTE NURSING/CASE MANAGEMENT/SOCIAL WORK - PATIENT PORTAL LINK FT
You can access the FollowMyHealth Patient Portal offered by [facility] by registering at the following website: [email or web address]. By joining [employer]’s FollowMyHealth portal, you will also be able to view your health information using other applications (apps) compatible with our system.

## 2023-06-18 NOTE — PATIENT CHOICE NOTE. - NSPTCHOICESTATE_GEN_ALL_CORE

## 2023-06-20 ENCOUNTER — NON-APPOINTMENT (OUTPATIENT)
Age: 63
End: 2023-06-20

## 2023-06-26 ENCOUNTER — LABORATORY RESULT (OUTPATIENT)
Age: 63
End: 2023-06-26

## 2023-06-26 ENCOUNTER — APPOINTMENT (OUTPATIENT)
Dept: PULMONOLOGY | Facility: CLINIC | Age: 63
End: 2023-06-26
Payer: MEDICARE

## 2023-06-26 VITALS
TEMPERATURE: 98 F | HEART RATE: 84 BPM | OXYGEN SATURATION: 97 % | SYSTOLIC BLOOD PRESSURE: 118 MMHG | DIASTOLIC BLOOD PRESSURE: 76 MMHG | RESPIRATION RATE: 16 BRPM | HEIGHT: 67.5 IN | WEIGHT: 164 LBS | BODY MASS INDEX: 25.44 KG/M2

## 2023-06-26 DIAGNOSIS — J92.9 PLEURAL PLAQUE W/OUT ASBESTOS: ICD-10-CM

## 2023-06-26 PROBLEM — F41.9 ANXIETY DISORDER, UNSPECIFIED: Chronic | Status: ACTIVE | Noted: 2023-06-17

## 2023-06-26 PROBLEM — I10 ESSENTIAL (PRIMARY) HYPERTENSION: Chronic | Status: ACTIVE | Noted: 2023-06-17

## 2023-06-26 PROBLEM — G43.909 MIGRAINE, UNSPECIFIED, NOT INTRACTABLE, WITHOUT STATUS MIGRAINOSUS: Chronic | Status: ACTIVE | Noted: 2023-06-17

## 2023-06-26 PROBLEM — K92.2 GASTROINTESTINAL HEMORRHAGE, UNSPECIFIED: Chronic | Status: ACTIVE | Noted: 2023-06-17

## 2023-06-26 PROCEDURE — 94727 GAS DIL/WSHOT DETER LNG VOL: CPT

## 2023-06-26 PROCEDURE — 36415 COLL VENOUS BLD VENIPUNCTURE: CPT

## 2023-06-26 PROCEDURE — 99205 OFFICE O/P NEW HI 60 MIN: CPT | Mod: 25

## 2023-06-26 PROCEDURE — 94729 DIFFUSING CAPACITY: CPT

## 2023-06-26 PROCEDURE — 94010 BREATHING CAPACITY TEST: CPT

## 2023-06-26 RX ORDER — MIRTAZAPINE 15 MG/1
15 TABLET, FILM COATED ORAL
Qty: 15 | Refills: 0 | Status: DISCONTINUED | COMMUNITY
Start: 2023-01-31

## 2023-06-26 RX ORDER — PREDNISONE 20 MG/1
20 TABLET ORAL
Qty: 10 | Refills: 0 | Status: DISCONTINUED | COMMUNITY
Start: 2023-02-05

## 2023-06-26 RX ORDER — AZITHROMYCIN 250 MG/1
250 TABLET, FILM COATED ORAL
Qty: 6 | Refills: 0 | Status: DISCONTINUED | COMMUNITY
Start: 2023-03-14

## 2023-06-26 RX ORDER — IBUPROFEN 800 MG/1
800 TABLET, FILM COATED ORAL
Qty: 12 | Refills: 0 | Status: DISCONTINUED | COMMUNITY
Start: 2023-03-14

## 2023-06-26 RX ORDER — SOLIFENACIN SUCCINATE 10 MG/1
10 TABLET ORAL
Qty: 30 | Refills: 3 | Status: DISCONTINUED | COMMUNITY
Start: 2021-11-08 | End: 2023-06-26

## 2023-06-26 RX ORDER — PANTOPRAZOLE 40 MG/1
40 TABLET, DELAYED RELEASE ORAL
Qty: 30 | Refills: 0 | Status: ACTIVE | COMMUNITY
Start: 2023-06-15

## 2023-06-26 RX ORDER — OXYCODONE AND ACETAMINOPHEN 5; 325 MG/1; MG/1
5-325 TABLET ORAL
Qty: 12 | Refills: 0 | Status: DISCONTINUED | COMMUNITY
Start: 2023-03-14

## 2023-06-26 RX ORDER — TRAZODONE HYDROCHLORIDE 50 MG/1
50 TABLET ORAL
Refills: 0 | Status: DISCONTINUED | COMMUNITY
End: 2023-06-26

## 2023-06-26 RX ORDER — AMLODIPINE BESYLATE 5 MG/1
5 TABLET ORAL
Qty: 90 | Refills: 0 | Status: DISCONTINUED | COMMUNITY
Start: 2023-04-10

## 2023-06-26 RX ORDER — DULOXETINE HYDROCHLORIDE 20 MG/1
20 CAPSULE, DELAYED RELEASE PELLETS ORAL
Qty: 90 | Refills: 0 | Status: DISCONTINUED | COMMUNITY
Start: 2023-04-06

## 2023-06-26 NOTE — HISTORY OF PRESENT ILLNESS
[Never] : never [TextBox_4] : Ms. MARIA A JIMENES is a 63 year old never smoker with history of asbestos exposure, history of asthma is here for evaluation\par \par Patient was recently hospitalized with anemia in the setting of GI bleed.  She required several transfusions.  Reports feeling better overall but still fatigued.  Recent blood work done with PCP\par \par Does report hx of asthma - using Albuterol prn only. Was previously on Advair for many years. usual triggers - allergies/chemicals. Never been hospitalized. \par For past 4-5 gets SOB with exercise or exertion. Has been using Albuterol with improvement in sx. Using Albuterol about 2 times/month. She is not very physically active, partially b/c she gets winded so quickly. \par Was treated with a course of steroids/Abx for bad cough in 2023. usually requires steroid about 3 times/year. \par She follows with cardiology Dr Metz - reports recent normal EKG, Echo. Scheduled for stress test. \par \par Was a  for 35 years. 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 \par \par ROS: \par +hay fever; allergic to cats; denies sinus issues; has dogs at home\par denies fevers, chills, night sweats, unintentional weight loss\par denies known autoimmune disease\par \par PMH: depression/OCD\par PSH; appendectomy\par Meds: per chart\par All: Penicillin\par SH: never smoker; Was a  for 35 years. 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 \par FH: father  of lung ca at 58 (was in [employer] business, was smoker)\par PMD: CRISTO ANWAR\par Immunizations: Prevnar 2023\par

## 2023-06-26 NOTE — ASSESSMENT
[FreeTextEntry1] : Ms. MARIA A JIMENES is a 63 year old never smoker with history of asbestos exposure, history of asthma is here for evaluation. \par \par #Asthma -mild wheezing noted with forced exhale today.  Patient reports persistent SCHNEIDER, frequent steroid use. \par -- Start ICS containing inhaler, proper use discussed.  Can use albuterol on as-needed basis\par -- Obtain pulmonary function testing, IgE, eos, allergy panel\par \par \par #Pleural calcifications -incidentally noted on chest x-ray 6/2023. \par Patient reports history of asbestos exposure.  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 Patient in agreement with plan. \par Follow up in 4-6w or sooner if needed.\par \par

## 2023-06-26 NOTE — CONSULT LETTER
[Dear  ___] : Dear  [unfilled], [Consult Letter:] : I had the pleasure of evaluating your patient, [unfilled]. [Please see my note below.] : Please see my note below. [Consult Closing:] : Thank you very much for allowing me to participate in the care of this patient.  If you have any questions, please do not hesitate to contact me. [FreeTextEntry3] : Sincerely,\par \par Devika Amador MD\par [facility] Physician [facility]\par Pulmonary Medicine\par tel: [phone]\par fax: [phone]\par

## 2023-06-26 NOTE — PHYSICAL EXAM
[No Acute Distress] : no acute distress [Normal Oropharynx] : normal oropharynx [Normal Appearance] : normal appearance [No Neck Mass] : no neck mass [Normal Rate/Rhythm] : normal rate/rhythm [Normal S1, S2] : normal s1, s2 [No Murmurs] : no murmurs [No Resp Distress] : no resp distress [No Abnormalities] : no abnormalities [Benign] : benign [Normal Gait] : normal gait [No Clubbing] : no clubbing [No Cyanosis] : no cyanosis [No Edema] : no edema [FROM] : FROM [Normal Color/ Pigmentation] : normal color/ pigmentation [No Focal Deficits] : no focal deficits [Oriented x3] : oriented x3 [Normal Affect] : normal affect [TextBox_68] : Mild wheezing with forced exhale

## 2023-06-27 ENCOUNTER — APPOINTMENT (OUTPATIENT)
Dept: CT IMAGING | Facility: CLINIC | Age: 63
End: 2023-06-27
Payer: MEDICARE

## 2023-06-27 LAB — TOTAL IGE SMQN RAST: 730 KU/L

## 2023-06-27 PROCEDURE — 71250 CT THORAX DX C-: CPT

## 2023-06-27 RX ORDER — DULOXETINE HYDROCHLORIDE 30 MG/1
1 CAPSULE, DELAYED RELEASE ORAL
Refills: 0 | DISCHARGE

## 2023-06-27 RX ORDER — ALBUTEROL 90 UG/1
2 AEROSOL, METERED ORAL
Refills: 0 | DISCHARGE

## 2023-06-27 RX ORDER — TRAZODONE HCL 50 MG
1 TABLET ORAL
Refills: 0 | DISCHARGE

## 2023-06-28 LAB
A ALTERNATA IGE QN: <0.1 KUA/L
A FUMIGATUS IGE QN: <0.1 KUA/L
BERMUDA GRASS IGE QN: 0.61 KUA/L
BOXELDER IGE QN: 0.23 KUA/L
C HERBARUM IGE QN: <0.1 KUA/L
CALIF WALNUT IGE QN: 0.44 KUA/L
CAT DANDER IGE QN: 3.73 KUA/L
CMN PIGWEED IGE QN: 0.24 KUA/L
COMMON RAGWEED IGE QN: 0.29 KUA/L
COTTONWOOD IGE QN: 0.22 KUA/L
D FARINAE IGE QN: 7.05 KUA/L
D PTERONYSS IGE QN: 5.54 KUA/L
DEPRECATED A ALTERNATA IGE RAST QL: 0
DEPRECATED A FUMIGATUS IGE RAST QL: 0
DEPRECATED BERMUDA GRASS IGE RAST QL: 1
DEPRECATED BOXELDER IGE RAST QL: NORMAL
DEPRECATED C HERBARUM IGE RAST QL: 0
DEPRECATED CAT DANDER IGE RAST QL: 3
DEPRECATED COMMON PIGWEED IGE RAST QL: NORMAL
DEPRECATED COMMON RAGWEED IGE RAST QL: NORMAL
DEPRECATED COTTONWOOD IGE RAST QL: NORMAL
DEPRECATED D FARINAE IGE RAST QL: 3
DEPRECATED D PTERONYSS IGE RAST QL: 3
DEPRECATED DOG DANDER IGE RAST QL: 3
DEPRECATED GOOSEFOOT IGE RAST QL: NORMAL
DEPRECATED LONDON PLANE IGE RAST QL: NORMAL
DEPRECATED MOUSE URINE PROT IGE RAST QL: 1
DEPRECATED MUGWORT IGE RAST QL: NORMAL
DEPRECATED P NOTATUM IGE RAST QL: 0
DEPRECATED RED CEDAR IGE RAST QL: NORMAL
DEPRECATED ROACH IGE RAST QL: NORMAL
DEPRECATED SHEEP SORREL IGE RAST QL: NORMAL
DEPRECATED SILVER BIRCH IGE RAST QL: NORMAL
DEPRECATED TIMOTHY IGE RAST QL: 3
DEPRECATED WHITE ASH IGE RAST QL: 1
DEPRECATED WHITE OAK IGE RAST QL: NORMAL
DOG DANDER IGE QN: 10.8 KUA/L
GOOSEFOOT IGE QN: 0.24 KUA/L
LONDON PLANE IGE QN: 0.24 KUA/L
MOUSE URINE PROT IGE QN: 0.63 KUA/L
MUGWORT IGE QN: 0.23 KUA/L
MULBERRY (T70) CLASS: 0
MULBERRY (T70) CONC: <0.1 KUA/L
P NOTATUM IGE QN: <0.1 KUA/L
RED CEDAR IGE QN: 0.22 KUA/L
ROACH IGE QN: 0.1 KUA/L
SHEEP SORREL IGE QN: 0.24 KUA/L
SILVER BIRCH IGE QN: 0.17 KUA/L
TIMOTHY IGE QN: 3.5 KUA/L
TREE ALLERG MIX1 IGE QL: 1
WHITE ASH IGE QN: 0.41 KUA/L
WHITE ELM IGE QN: 0.34 KUA/L
WHITE ELM IGE QN: NORMAL
WHITE OAK IGE QN: 0.31 KUA/L

## 2023-07-25 ENCOUNTER — APPOINTMENT (OUTPATIENT)
Dept: PULMONOLOGY | Facility: CLINIC | Age: 63
End: 2023-07-25

## 2023-07-25 PROBLEM — G43.909 MIGRAINE, UNSPECIFIED, NOT INTRACTABLE, WITHOUT STATUS MIGRAINOSUS: Chronic | Status: ACTIVE | Noted: 2023-06-14

## 2023-07-25 PROBLEM — J45.909 UNSPECIFIED ASTHMA, UNCOMPLICATED: Chronic | Status: ACTIVE | Noted: 2023-06-14

## 2023-07-25 PROBLEM — I10 ESSENTIAL (PRIMARY) HYPERTENSION: Chronic | Status: ACTIVE | Noted: 2023-06-14

## 2023-07-31 ENCOUNTER — APPOINTMENT (OUTPATIENT)
Dept: PULMONOLOGY | Facility: CLINIC | Age: 63
End: 2023-07-31
Payer: MEDICARE

## 2023-07-31 VITALS
WEIGHT: 166 LBS | DIASTOLIC BLOOD PRESSURE: 83 MMHG | SYSTOLIC BLOOD PRESSURE: 128 MMHG | HEART RATE: 87 BPM | BODY MASS INDEX: 25.62 KG/M2 | OXYGEN SATURATION: 98 %

## 2023-07-31 PROCEDURE — 99214 OFFICE O/P EST MOD 30 MIN: CPT

## 2023-08-02 ENCOUNTER — APPOINTMENT (OUTPATIENT)
Dept: ORTHOPEDIC SURGERY | Facility: CLINIC | Age: 63
End: 2023-08-02

## 2023-08-02 NOTE — PATIENT PROFILE ADULT - .
15-Sree-2023 03:43:22
yes...
Island Pedicle Flap With Canthal Suspension Text: The defect edges were debeveled with a #15 scalpel blade.  Given the location of the defect, shape of the defect and the proximity to free margins an island pedicle advancement flap was deemed most appropriate.  Using a sterile surgical marker, an appropriate advancement flap was drawn incorporating the defect, outlining the appropriate donor tissue and placing the expected incisions within the relaxed skin tension lines where possible. The area thus outlined was incised deep to adipose tissue with a #15 scalpel blade.  The skin margins were undermined to an appropriate distance in all directions around the primary defect and laterally outward around the island pedicle utilizing iris scissors.  There was minimal undermining beneath the pedicle flap. A suspension suture was placed in the canthal tendon to prevent tension and prevent ectropion.

## 2023-08-17 NOTE — CONSULT LETTER
[Dear  ___] : Dear  [unfilled], [Consult Letter:] : I had the pleasure of evaluating your patient, [unfilled]. [Please see my note below.] : Please see my note below. [Consult Closing:] : Thank you very much for allowing me to participate in the care of this patient.  If you have any questions, please do not hesitate to contact me. [FreeTextEntry3] : Sincerely,\par  \par  Devika Amador MD\par  [facility] Physician [facility]\par  Pulmonary Medicine\par  tel: [phone]\par  fax: [phone]\par

## 2023-08-17 NOTE — HISTORY OF PRESENT ILLNESS
[Never] : never [TextBox_4] : Ms. MARIA A JIMENES is a 63 year old never smoker with history of asbestos exposure, history of asthma is here for evaluation  Patient was recently hospitalized with anemia in the setting of GI bleed.  She required several transfusions.  Reports feeling better overall but still fatigued.  Recent blood work done with PCP  Does report hx of asthma - using Albuterol prn only. Was previously on Advair for many years. usual triggers - allergies/chemicals. Never been hospitalized.  For past 4-5 gets SOB with exercise or exertion. Has been using Albuterol with improvement in sx. Using Albuterol about 2 times/month. She is not very physically active, partially b/c she gets winded so quickly.  Was treated with a course of steroids/Abx for bad cough in 2023. usually requires steroid about 3 times/year.  She follows with cardiology Dr Metz - reports recent normal EKG, Echo. Scheduled for stress test.   Was a  for 35 years. 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   ________________ Interval events Last visit, started on Flovent.  Has been using 1 puff once a day only. Notices persistent shortness of breath when walking around.  ROS:  +hay fever; allergic to cats; denies sinus issues; has dogs at home denies fevers, chills, night sweats, unintentional weight loss denies known autoimmune disease  PMH: depression/OCD PSH; appendectomy Meds: per chart All: Penicillin SH: never smoker; Was a  for 35 years. 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  FH: father  of lung ca at 58 (was in [employer] business, was smoker) PMD: CRISTO SWAIN Immunizations: Prevnar 2023

## 2023-08-17 NOTE — ASSESSMENT
[FreeTextEntry1] : Ms. MARIA A JIMENES is a 63 year old never smoker with history of asbestos exposure, history of asthma is here for evaluation.   #Asthma -mild wheezing noted with forced exhale today.  Patient reports persistent SCHNEIDER, frequent steroid use.  -- Encouraged twice daily use of Flovent -- Continue over-the-counter antihistamines   #Pleural calcifications -incidentally noted on chest x-ray 6/2023.  Patient reports history of asbestos exposure.  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    All questions answered. Patient in agreement with plan.  Follow up in 4-6w or sooner if needed.

## 2023-10-02 ENCOUNTER — APPOINTMENT (OUTPATIENT)
Dept: PULMONOLOGY | Facility: CLINIC | Age: 63
End: 2023-10-02

## 2023-10-27 ENCOUNTER — APPOINTMENT (OUTPATIENT)
Dept: PULMONOLOGY | Facility: CLINIC | Age: 63
End: 2023-10-27
Payer: MEDICARE

## 2023-10-27 VITALS
HEART RATE: 92 BPM | OXYGEN SATURATION: 99 % | DIASTOLIC BLOOD PRESSURE: 81 MMHG | SYSTOLIC BLOOD PRESSURE: 125 MMHG | WEIGHT: 174 LBS | BODY MASS INDEX: 26.85 KG/M2

## 2023-10-27 DIAGNOSIS — Z23 ENCOUNTER FOR IMMUNIZATION: ICD-10-CM

## 2023-10-27 PROCEDURE — 99215 OFFICE O/P EST HI 40 MIN: CPT | Mod: 25

## 2023-10-27 PROCEDURE — 90662 IIV NO PRSV INCREASED AG IM: CPT

## 2023-10-27 PROCEDURE — G0008: CPT

## 2023-11-16 ENCOUNTER — TRANSCRIPTION ENCOUNTER (OUTPATIENT)
Age: 63
End: 2023-11-16

## 2024-01-02 ENCOUNTER — RX RENEWAL (OUTPATIENT)
Age: 64
End: 2024-01-02

## 2024-01-31 ENCOUNTER — APPOINTMENT (OUTPATIENT)
Dept: PULMONOLOGY | Facility: CLINIC | Age: 64
End: 2024-01-31
Payer: MEDICARE

## 2024-01-31 VITALS
DIASTOLIC BLOOD PRESSURE: 82 MMHG | HEART RATE: 96 BPM | BODY MASS INDEX: 26.85 KG/M2 | WEIGHT: 174 LBS | TEMPERATURE: 97.6 F | OXYGEN SATURATION: 100 % | SYSTOLIC BLOOD PRESSURE: 126 MMHG

## 2024-01-31 PROCEDURE — 99214 OFFICE O/P EST MOD 30 MIN: CPT

## 2024-01-31 RX ORDER — FLUTICASONE PROPIONATE 110 UG/1
110 AEROSOL, METERED RESPIRATORY (INHALATION)
Qty: 1 | Refills: 2 | Status: DISCONTINUED | COMMUNITY
Start: 2023-06-26 | End: 2024-01-31

## 2024-01-31 RX ORDER — MONTELUKAST 10 MG/1
10 TABLET, FILM COATED ORAL
Qty: 90 | Refills: 1 | Status: DISCONTINUED | COMMUNITY
Start: 2023-10-27 | End: 2024-01-31

## 2024-01-31 NOTE — PHYSICAL EXAM
[No Acute Distress] : no acute distress [Normal Oropharynx] : normal oropharynx [Normal Appearance] : normal appearance [No Neck Mass] : no neck mass [Normal Rate/Rhythm] : normal rate/rhythm [Normal S1, S2] : normal s1, s2 [No Murmurs] : no murmurs [No Resp Distress] : no resp distress [No Abnormalities] : no abnormalities [Benign] : benign [Normal Gait] : normal gait [No Clubbing] : no clubbing [No Cyanosis] : no cyanosis [No Edema] : no edema [FROM] : FROM [Normal Color/ Pigmentation] : normal color/ pigmentation [No Focal Deficits] : no focal deficits [Oriented x3] : oriented x3 [Normal Affect] : normal affect [TextBox_68] : clear lungs

## 2024-01-31 NOTE — ASSESSMENT
[FreeTextEntry1] : Ms. MARIA A JIMENES is a 63 year old never smoker with history of asbestos exposure, history of asthma is here for f/u.   #Asthma with multiple allergies. Sx appear to me triggered by allergies -- did not tolerate Singulair, c/w Allegra --allergy referral, continued allergen avoidance -- change from ICS to ICS-LABA inahler  #Pleural calcifications -incidentally noted on chest x-ray 6/2023. Patient reports history of asbestos exposure. 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  All questions answered. Patient in agreement with plan. Follow up in 3 mo or sooner if needed.

## 2024-01-31 NOTE — HISTORY OF PRESENT ILLNESS
[Never] : never [TextBox_4] : Ms. MARIA A JIMENES is a 63 year old never smoker with history of asbestos exposure, history of asthma is here for f/u.   History: Reports hx of asthma - using Albuterol prn only. Was previously on Advair for many years. usual triggers - allergies/chemicals. Never been hospitalized.  For past 4-5 gets SOB with exercise or exertion. Has been using Albuterol with improvement in sx. Using Albuterol about 2 times/month. She is not very physically active, partially b/c she gets winded so quickly.  Was treated with a course of steroids/Abx for bad cough in 2023. usually requires steroid about 3 times/year.  She follows with cardiology Dr Metz - reports recent normal EKG, Echo. Scheduled for stress test.   Was a  for 35 years. 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   ________________ Interval events Doing ok on Flovent 110 - 1 puff bid. Using Albuterol about once/week.  Notes SOB/chest tightness with exposure to allergens/cold. Some SCHNEIDER with walking up the stairs. Denies cough or wheezing.   Denies recent illness, no prednisone use.   ROS:  +hay fever; allergic to cats; denies sinus issues; has dogs at home denies fevers, chills, night sweats, unintentional weight loss denies known autoimmune disease  PMH: depression/OCD PSH; appendectomy Meds: per chart All: Penicillin SH: never smoker; Was a  for 35 years. 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  FH: father  of lung ca at 58 (was in [employer] business, was smoker) PMD: CRISTO SWAIN Immunizations: Prevnar 2023

## 2024-01-31 NOTE — CONSULT LETTER
[Dear  ___] : Dear  [unfilled], [Consult Letter:] : I had the pleasure of evaluating your patient, [unfilled]. [Please see my note below.] : Please see my note below. [Consult Closing:] : Thank you very much for allowing me to participate in the care of this patient.  If you have any questions, please do not hesitate to contact me. [FreeTextEntry3] : Sincerely,\par  \par  Devika Amador MD\par  [facility] Physician [facility]\par  Pulmonary Medicine\par  tel: [phone]\par  fax: [phone]\par

## 2024-02-08 RX ORDER — BUDESONIDE AND FORMOTEROL FUMARATE DIHYDRATE 80; 4.5 UG/1; UG/1
80-4.5 AEROSOL RESPIRATORY (INHALATION) TWICE DAILY
Qty: 1 | Refills: 3 | Status: DISCONTINUED | COMMUNITY
Start: 2024-01-31 | End: 2024-02-08

## 2024-05-02 ENCOUNTER — APPOINTMENT (OUTPATIENT)
Dept: PULMONOLOGY | Facility: CLINIC | Age: 64
End: 2024-05-02

## 2024-05-30 ENCOUNTER — APPOINTMENT (OUTPATIENT)
Dept: PULMONOLOGY | Facility: CLINIC | Age: 64
End: 2024-05-30
Payer: MEDICARE

## 2024-05-30 VITALS
BODY MASS INDEX: 27.16 KG/M2 | HEART RATE: 81 BPM | DIASTOLIC BLOOD PRESSURE: 73 MMHG | SYSTOLIC BLOOD PRESSURE: 117 MMHG | WEIGHT: 176 LBS | OXYGEN SATURATION: 97 %

## 2024-05-30 DIAGNOSIS — J45.909 UNSPECIFIED ASTHMA, UNCOMPLICATED: ICD-10-CM

## 2024-05-30 PROCEDURE — 94727 GAS DIL/WSHOT DETER LNG VOL: CPT

## 2024-05-30 PROCEDURE — ZZZZZ: CPT

## 2024-05-30 PROCEDURE — 94060 EVALUATION OF WHEEZING: CPT

## 2024-05-30 PROCEDURE — 94729 DIFFUSING CAPACITY: CPT

## 2024-05-30 PROCEDURE — 99214 OFFICE O/P EST MOD 30 MIN: CPT | Mod: 25

## 2024-05-30 RX ORDER — HYDROCHLOROTHIAZIDE 25 MG/1
25 TABLET ORAL
Refills: 0 | Status: ACTIVE | COMMUNITY

## 2024-05-30 RX ORDER — FLUTICASONE FUROATE AND VILANTEROL TRIFENATATE 100; 25 UG/1; UG/1
100-25 POWDER RESPIRATORY (INHALATION)
Qty: 3 | Refills: 1 | Status: ACTIVE | COMMUNITY
Start: 2024-02-08 | End: 1900-01-01

## 2024-05-30 RX ORDER — DULOXETINE HYDROCHLORIDE 40 MG/1
40 CAPSULE, DELAYED RELEASE PELLETS ORAL
Refills: 0 | Status: ACTIVE | COMMUNITY

## 2024-05-30 RX ORDER — ALBUTEROL SULFATE 90 UG/1
108 (90 BASE) INHALANT RESPIRATORY (INHALATION)
Qty: 1 | Refills: 3 | Status: ACTIVE | COMMUNITY

## 2024-05-30 RX ORDER — AMLODIPINE BESYLATE 5 MG/1
5 TABLET ORAL DAILY
Qty: 30 | Refills: 0 | Status: ACTIVE | COMMUNITY
Start: 2024-01-31

## 2024-05-30 NOTE — HISTORY OF PRESENT ILLNESS
[Never] : never [TextBox_4] : Ms. MARIA A JIMENES is a 63 year old never smoker with history of asbestos exposure, history of asthma is here for f/u.   History: Reports hx of asthma - using Albuterol prn only. Was previously on Advair for many years. usual triggers - allergies/chemicals. Never been hospitalized.  For past 4-5 gets SOB with exercise or exertion. Has been using Albuterol with improvement in sx. Using Albuterol about 2 times/month. She is not very physically active, partially b/c she gets winded so quickly.  Was treated with a course of steroids/Abx for bad cough in 2023. usually requires steroid about 3 times/year.  She follows with cardiology Dr Metz - reports recent normal EKG, Echo. Scheduled for stress test.   Was a  for 35 years. 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   ________________ Interval events Transitioned to Breo. Feels better on in. No albuterol  use.  Some SOB with exercise. Not relieved with Albuterol Had extensitve cardiac workup including normal cath recently.   Denies recent illness, no prednisone use.   ROS:  +hay fever; allergic to cats; denies sinus issues; has dogs at home denies fevers, chills, night sweats, unintentional weight loss denies known autoimmune disease  PMH: depression/OCD PSH; appendectomy Meds: per chart All: Penicillin SH: never smoker; Was a  for 35 years. 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  FH: father  of lung ca at 58 (was in [employer] business, was smoker) PMD: CRISTO SWAIN Immunizations: Prevnar 2023

## 2024-05-30 NOTE — ASSESSMENT
[FreeTextEntry1] : Ms. MARIA A JIMENES is a 64 year old never smoker with history of asbestos exposure, history of asthma is here for f/u.   #Asthma with multiple allergies. Sx appear to me triggered by allergies Currently well controlled on low dose Breo (patient notes benefit since starting). SCHNEIDER with exercise likely related to weight/deconditioning. Recent cardiac workup was normal Pulmonary function testing today was normal spirometry, normal lung volumes, normal DLCO -- c/w Breo 100-25, Albuterol prn -- did not tolerate Singulair, c/w Allegra  #Pleural calcifications -incidentally noted on chest x-ray 6/2023. Patient reports history of asbestos exposure. 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 Patient in agreement with plan. Follow up in 5-6mo mo or sooner if needed.

## 2024-05-30 NOTE — CONSULT LETTER
[Dear  ___] : Dear  [unfilled], [Consult Letter:] : I had the pleasure of evaluating your patient, [unfilled]. [Please see my note below.] : Please see my note below. [Consult Closing:] : Thank you very much for allowing me to participate in the care of this patient.  If you have any questions, please do not hesitate to contact me. [FreeTextEntry3] : Sincerely,\par  \par  Devika Amador MD\par  [facility] Physician [facility]\par  Pulmonary Medicine\par  tel: [phone]\par  fax: [phone]\par

## 2024-06-27 ENCOUNTER — NON-APPOINTMENT (OUTPATIENT)
Age: 64
End: 2024-06-27

## 2024-08-15 ENCOUNTER — RX RENEWAL (OUTPATIENT)
Age: 64
End: 2024-08-15

## 2024-08-26 NOTE — H&P ADULT - NSICDXPASTMEDICALHX_GEN_ALL_CORE_FT
PAST MEDICAL HISTORY:  Anxiety     GI bleed     HTN (hypertension)     Migraine      Hemostasis: Electrofulguration Quality 431: Preventive Care And Screening: Unhealthy Alcohol Use - Screening: Patient not identified as an unhealthy alcohol user when screened for unhealthy alcohol use using a systematic screening method Detail Level: Detailed Quality 130: Documentation Of Current Medications In The Medical Record: Current Medications Documented Quality 226: Preventive Care And Screening: Tobacco Use: Screening And Cessation Intervention: Tobacco Screening not Performed

## 2024-09-26 NOTE — PATIENT PROFILE ADULT - IS PATIENT POST-MENOPAUSAL?
yes

## 2024-10-13 ENCOUNTER — NON-APPOINTMENT (OUTPATIENT)
Age: 64
End: 2024-10-13

## 2024-10-13 ENCOUNTER — INPATIENT (INPATIENT)
Facility: HOSPITAL | Age: 64
LOS: 2 days | Discharge: HOME | DRG: 195 | End: 2024-10-16
Attending: STUDENT IN AN ORGANIZED HEALTH CARE EDUCATION/TRAINING PROGRAM | Admitting: INTERNAL MEDICINE
Payer: MEDICARE

## 2024-10-13 VITALS
OXYGEN SATURATION: 95 % | SYSTOLIC BLOOD PRESSURE: 107 MMHG | TEMPERATURE: 98 F | WEIGHT: 156.09 LBS | HEART RATE: 122 BPM | HEIGHT: 65 IN | DIASTOLIC BLOOD PRESSURE: 76 MMHG | RESPIRATION RATE: 22 BRPM

## 2024-10-13 DIAGNOSIS — U07.1 COVID-19: ICD-10-CM

## 2024-10-13 DIAGNOSIS — Z29.9 ENCOUNTER FOR PROPHYLACTIC MEASURES, UNSPECIFIED: ICD-10-CM

## 2024-10-13 DIAGNOSIS — R74.01 ELEVATION OF LEVELS OF LIVER TRANSAMINASE LEVELS: ICD-10-CM

## 2024-10-13 DIAGNOSIS — F41.9 ANXIETY DISORDER, UNSPECIFIED: ICD-10-CM

## 2024-10-13 DIAGNOSIS — Z90.49 ACQUIRED ABSENCE OF OTHER SPECIFIED PARTS OF DIGESTIVE TRACT: Chronic | ICD-10-CM

## 2024-10-13 DIAGNOSIS — J18.9 PNEUMONIA, UNSPECIFIED ORGANISM: ICD-10-CM

## 2024-10-13 DIAGNOSIS — E87.8 OTHER DISORDERS OF ELECTROLYTE AND FLUID BALANCE, NOT ELSEWHERE CLASSIFIED: ICD-10-CM

## 2024-10-13 DIAGNOSIS — I10 ESSENTIAL (PRIMARY) HYPERTENSION: ICD-10-CM

## 2024-10-13 LAB
ALBUMIN SERPL ELPH-MCNC: 2.7 G/DL — LOW (ref 3.3–5)
ALP SERPL-CCNC: 304 U/L — HIGH (ref 40–120)
ALT FLD-CCNC: 108 U/L — HIGH (ref 12–78)
ANION GAP SERPL CALC-SCNC: 11 MMOL/L — SIGNIFICANT CHANGE UP (ref 5–17)
APTT BLD: 35.4 SEC — SIGNIFICANT CHANGE UP (ref 24.5–35.6)
AST SERPL-CCNC: 94 U/L — HIGH (ref 15–37)
BASOPHILS # BLD AUTO: 0.09 K/UL — SIGNIFICANT CHANGE UP (ref 0–0.2)
BASOPHILS NFR BLD AUTO: 0.5 % — SIGNIFICANT CHANGE UP (ref 0–2)
BILIRUB SERPL-MCNC: 1.2 MG/DL — SIGNIFICANT CHANGE UP (ref 0.2–1.2)
BUN SERPL-MCNC: 12 MG/DL — SIGNIFICANT CHANGE UP (ref 7–23)
CALCIUM SERPL-MCNC: 9.6 MG/DL — SIGNIFICANT CHANGE UP (ref 8.5–10.1)
CHLORIDE SERPL-SCNC: 92 MMOL/L — LOW (ref 96–108)
CO2 SERPL-SCNC: 30 MMOL/L — SIGNIFICANT CHANGE UP (ref 22–31)
CREAT SERPL-MCNC: 0.79 MG/DL — SIGNIFICANT CHANGE UP (ref 0.5–1.3)
D DIMER BLD IA.RAPID-MCNC: 241 NG/ML DDU — HIGH
EGFR: 82 ML/MIN/1.73M2 — SIGNIFICANT CHANGE UP
EOSINOPHIL # BLD AUTO: 0.08 K/UL — SIGNIFICANT CHANGE UP (ref 0–0.5)
EOSINOPHIL NFR BLD AUTO: 0.4 % — SIGNIFICANT CHANGE UP (ref 0–6)
FLUAV AG NPH QL: SIGNIFICANT CHANGE UP
FLUBV AG NPH QL: SIGNIFICANT CHANGE UP
GLUCOSE SERPL-MCNC: 117 MG/DL — HIGH (ref 70–99)
HCT VFR BLD CALC: 39 % — SIGNIFICANT CHANGE UP (ref 34.5–45)
HGB BLD-MCNC: 13.4 G/DL — SIGNIFICANT CHANGE UP (ref 11.5–15.5)
IMM GRANULOCYTES NFR BLD AUTO: 1.9 % — HIGH (ref 0–0.9)
INR BLD: 1.14 RATIO — SIGNIFICANT CHANGE UP (ref 0.85–1.16)
LACTATE SERPL-SCNC: 1.2 MMOL/L — SIGNIFICANT CHANGE UP (ref 0.7–2)
LYMPHOCYTES # BLD AUTO: 1.98 K/UL — SIGNIFICANT CHANGE UP (ref 1–3.3)
LYMPHOCYTES # BLD AUTO: 10.9 % — LOW (ref 13–44)
MCHC RBC-ENTMCNC: 33.1 PG — SIGNIFICANT CHANGE UP (ref 27–34)
MCHC RBC-ENTMCNC: 34.4 GM/DL — SIGNIFICANT CHANGE UP (ref 32–36)
MCV RBC AUTO: 96.3 FL — SIGNIFICANT CHANGE UP (ref 80–100)
MONOCYTES # BLD AUTO: 1.47 K/UL — HIGH (ref 0–0.9)
MONOCYTES NFR BLD AUTO: 8.1 % — SIGNIFICANT CHANGE UP (ref 2–14)
NEUTROPHILS # BLD AUTO: 14.19 K/UL — HIGH (ref 1.8–7.4)
NEUTROPHILS NFR BLD AUTO: 78.2 % — HIGH (ref 43–77)
NRBC # BLD: 0 /100 WBCS — SIGNIFICANT CHANGE UP (ref 0–0)
PLATELET # BLD AUTO: 467 K/UL — HIGH (ref 150–400)
POTASSIUM SERPL-MCNC: 3 MMOL/L — LOW (ref 3.5–5.3)
POTASSIUM SERPL-SCNC: 3 MMOL/L — LOW (ref 3.5–5.3)
PROCALCITONIN SERPL-MCNC: 0.32 NG/ML — HIGH
PROT SERPL-MCNC: 7.9 G/DL — SIGNIFICANT CHANGE UP (ref 6–8.3)
PROTHROM AB SERPL-ACNC: 13.3 SEC — SIGNIFICANT CHANGE UP (ref 9.9–13.4)
RBC # BLD: 4.05 M/UL — SIGNIFICANT CHANGE UP (ref 3.8–5.2)
RBC # FLD: 12.7 % — SIGNIFICANT CHANGE UP (ref 10.3–14.5)
RSV RNA NPH QL NAA+NON-PROBE: SIGNIFICANT CHANGE UP
SARS-COV-2 RNA SPEC QL NAA+PROBE: DETECTED
SODIUM SERPL-SCNC: 133 MMOL/L — LOW (ref 135–145)
WBC # BLD: 18.15 K/UL — HIGH (ref 3.8–10.5)
WBC # FLD AUTO: 18.15 K/UL — HIGH (ref 3.8–10.5)

## 2024-10-13 PROCEDURE — 93010 ELECTROCARDIOGRAM REPORT: CPT

## 2024-10-13 PROCEDURE — 99285 EMERGENCY DEPT VISIT HI MDM: CPT | Mod: FS

## 2024-10-13 PROCEDURE — 71045 X-RAY EXAM CHEST 1 VIEW: CPT | Mod: 26

## 2024-10-13 PROCEDURE — 71275 CT ANGIOGRAPHY CHEST: CPT | Mod: 26,MC

## 2024-10-13 RX ORDER — ACETAMINOPHEN 325 MG
1000 TABLET ORAL ONCE
Refills: 0 | Status: COMPLETED | OUTPATIENT
Start: 2024-10-13 | End: 2024-10-13

## 2024-10-13 RX ORDER — 5-HYDROXYTRYPTOPHAN (5-HTP) 100 MG
3 TABLET,DISINTEGRATING ORAL AT BEDTIME
Refills: 0 | Status: DISCONTINUED | OUTPATIENT
Start: 2024-10-13 | End: 2024-10-16

## 2024-10-13 RX ORDER — DULOXETINE HCL 20 MG
40 CAPSULE,DELAYED RELEASE (ENTERIC COATED) ORAL DAILY
Refills: 0 | Status: DISCONTINUED | OUTPATIENT
Start: 2024-10-13 | End: 2024-10-16

## 2024-10-13 RX ORDER — AZITHROMYCIN 250 MG/1
500 TABLET, FILM COATED ORAL ONCE
Refills: 0 | Status: COMPLETED | OUTPATIENT
Start: 2024-10-13 | End: 2024-10-13

## 2024-10-13 RX ORDER — FLUTICASONE PROPION/SALMETEROL 100-50 MCG
1 BLISTER, WITH INHALATION DEVICE INHALATION
Refills: 0 | Status: DISCONTINUED | OUTPATIENT
Start: 2024-10-13 | End: 2024-10-16

## 2024-10-13 RX ORDER — AMLODIPINE BESYLATE 5 MG
1 TABLET ORAL
Refills: 0 | DISCHARGE

## 2024-10-13 RX ORDER — CEFTRIAXONE SODIUM 1 G
1000 VIAL (EA) INJECTION EVERY 24 HOURS
Refills: 0 | Status: DISCONTINUED | OUTPATIENT
Start: 2024-10-14 | End: 2024-10-16

## 2024-10-13 RX ORDER — PANTOPRAZOLE SODIUM 40 MG/1
40 TABLET, DELAYED RELEASE ORAL
Refills: 0 | Status: DISCONTINUED | OUTPATIENT
Start: 2024-10-13 | End: 2024-10-16

## 2024-10-13 RX ORDER — AMLODIPINE BESYLATE 5 MG
5 TABLET ORAL DAILY
Refills: 0 | Status: DISCONTINUED | OUTPATIENT
Start: 2024-10-13 | End: 2024-10-13

## 2024-10-13 RX ORDER — IPRATROPIUM BROMIDE AND ALBUTEROL SULFATE .5; 3 MG/3ML; MG/3ML
3 SOLUTION RESPIRATORY (INHALATION) ONCE
Refills: 0 | Status: COMPLETED | OUTPATIENT
Start: 2024-10-13 | End: 2024-10-13

## 2024-10-13 RX ORDER — SODIUM CHLORIDE 0.9 % (FLUSH) 0.9 %
2000 SYRINGE (ML) INJECTION ONCE
Refills: 0 | Status: COMPLETED | OUTPATIENT
Start: 2024-10-13 | End: 2024-10-13

## 2024-10-13 RX ORDER — DULOXETINE HCL 20 MG
1 CAPSULE,DELAYED RELEASE (ENTERIC COATED) ORAL
Refills: 0 | DISCHARGE

## 2024-10-13 RX ORDER — ALBUTEROL 90 MCG
2 AEROSOL (GRAM) INHALATION EVERY 6 HOURS
Refills: 0 | Status: DISCONTINUED | OUTPATIENT
Start: 2024-10-13 | End: 2024-10-16

## 2024-10-13 RX ORDER — ACETAMINOPHEN 325 MG
650 TABLET ORAL EVERY 6 HOURS
Refills: 0 | Status: DISCONTINUED | OUTPATIENT
Start: 2024-10-13 | End: 2024-10-16

## 2024-10-13 RX ORDER — CEFTRIAXONE SODIUM 1 G
1000 VIAL (EA) INJECTION ONCE
Refills: 0 | Status: COMPLETED | OUTPATIENT
Start: 2024-10-13 | End: 2024-10-13

## 2024-10-13 RX ORDER — AZITHROMYCIN 250 MG/1
500 TABLET, FILM COATED ORAL EVERY 24 HOURS
Refills: 0 | Status: DISCONTINUED | OUTPATIENT
Start: 2024-10-14 | End: 2024-10-16

## 2024-10-13 RX ORDER — ENOXAPARIN SODIUM 150 MG/ML
40 INJECTION SUBCUTANEOUS EVERY 24 HOURS
Refills: 0 | Status: DISCONTINUED | OUTPATIENT
Start: 2024-10-13 | End: 2024-10-16

## 2024-10-13 RX ORDER — METHYLPREDNISOLONE ACETATE 80 MG/ML
125 INJECTION, SUSPENSION INTRA-ARTICULAR; INTRALESIONAL; INTRAMUSCULAR; SOFT TISSUE ONCE
Refills: 0 | Status: COMPLETED | OUTPATIENT
Start: 2024-10-13 | End: 2024-10-13

## 2024-10-13 RX ADMIN — Medication 100 MILLIGRAM(S): at 18:23

## 2024-10-13 RX ADMIN — Medication 400 MILLIGRAM(S): at 18:24

## 2024-10-13 RX ADMIN — Medication 40 MILLIEQUIVALENT(S): at 18:52

## 2024-10-13 RX ADMIN — Medication 1000 MILLIGRAM(S): at 19:15

## 2024-10-13 RX ADMIN — IPRATROPIUM BROMIDE AND ALBUTEROL SULFATE 3 MILLILITER(S): .5; 3 SOLUTION RESPIRATORY (INHALATION) at 18:09

## 2024-10-13 RX ADMIN — AZITHROMYCIN 255 MILLIGRAM(S): 250 TABLET, FILM COATED ORAL at 18:23

## 2024-10-13 RX ADMIN — Medication 1000 MILLIGRAM(S): at 18:40

## 2024-10-13 RX ADMIN — METHYLPREDNISOLONE ACETATE 125 MILLIGRAM(S): 80 INJECTION, SUSPENSION INTRA-ARTICULAR; INTRALESIONAL; INTRAMUSCULAR; SOFT TISSUE at 18:09

## 2024-10-13 RX ADMIN — Medication 666.67 MILLILITER(S): at 18:23

## 2024-10-13 NOTE — H&P ADULT - NSHPREVIEWOFSYSTEMS_GEN_ALL_CORE
CONSTITUTIONAL: denies fever, chills, fatigue, weakness  HEENT: denies blurred vision, sore throat  SKIN: denies new lesions, rash  CARDIOVASCULAR: denies chest pain, chest pressure, palpitations  RESPIRATORY: denies shortness of breath, cough, sputum production  GASTROINTESTINAL: denies nausea, vomiting, diarrhea, abdominal pain, melena or hematochezia  GENITOURINARY: denies dysuria, discharge  NEUROLOGICAL: denies numbness, headache, focal weakness  MUSCULOSKELETAL: denies new joint pain, muscle aches  HEMATOLOGIC: denies gross bleeding, bruising CONSTITUTIONAL:  +fevers, denies chills  SKIN: denies new lesions, rash  CARDIOVASCULAR: denies chest pain, chest pressure, palpitations  RESPIRATORY: denies shortness of breath, +cough, +sputum production  GASTROINTESTINAL: +nausea, denies vomiting, diarrhea, abdominal pain, melena or hematochezia  GENITOURINARY: denies dysuria, discharge  NEUROLOGICAL: denies numbness, headache, focal weakness  MUSCULOSKELETAL: denies new joint pain, muscle aches  HEMATOLOGIC: denies gross bleeding, bruising

## 2024-10-13 NOTE — ED PROVIDER NOTE - OBJECTIVE STATEMENT
pt is a 64-year-old female with past medical hx of migraines anxiety hypertension GI bleed asthma non-smoker coming in for shortness of breath cough wheezing for the last 4 days.  Patient states she is having low-grade temperatures Tmax 100.2 at home.  Patient states he went to urgent care today advised to come to ED for further management.  Patient denies any abdominal pain nausea vomiting diarrhea recent travels leg swelling sick contacts.

## 2024-10-13 NOTE — H&P ADULT - PROBLEM SELECTOR PLAN 2
- Pt with alb 2.7, alk phos 304, ast 94, alt 108  - Likely related to COVID infection  - Will hold off on starting remdesivir for now given transaminitis - Pt with alb 2.7, alk phos 304, ast 94, alt 108  - Likely related to COVID infection  - Will hold off on starting remdesivir for now given transaminitis  - F/u AM liver panel  - Re eval with ID in AM - Pt with alb 2.7, alk phos 304, ast 94, alt 108  - Likely related to COVID infection  - Will hold off on starting remdesivir for now given transaminitis  - F/u AM liver panel  - F/u RUQ ultrasound  - Re eval remdesivir with ID in AM  - If liver panel worsens, consider GI consult

## 2024-10-13 NOTE — ED ADULT TRIAGE NOTE - BP NONINVASIVE DIASTOLIC (MM HG)
C/o constant, sharp abdominal pain with nausea and diarrhea while she is pregnant. Pt reports she is spotting but denies clots. 74

## 2024-10-13 NOTE — H&P ADULT - ASSESSMENT
Patient is a 64-year-old female with a history of asthma, HTN, anxiety, migraine, GI bleed (2019) presenting with cold and cough. Found to be COVID +, admitted for COVID and PNA. Patient is a 64-year-old female with a history of asthma, HTN, anxiety, migraine, GI bleed (2019) presenting with cold and cough. Found to be COVID +, admitted for COVID PNA with possible superimposed bacterial pneumonia.

## 2024-10-13 NOTE — H&P ADULT - NSHPPHYSICALEXAM_GEN_ALL_CORE
T(C): 37.6 (10-13-24 @ 19:53), Max: 38.8 (10-13-24 @ 17:15)  HR: 95 (10-13-24 @ 19:53) (95 - 122)  BP: 111/68 (10-13-24 @ 18:00) (107/76 - 111/68)  RR: 20 (10-13-24 @ 18:00) (20 - 22)  SpO2: 97% (10-13-24 @ 18:00) (95% - 97%)    GENERAL: patient appears well, no acute distress, appropriately interactive  EYES: sclera clear, no exudates  ENMT: oropharynx clear without erythema, no exudates, moist mucous membranes  NECK: supple, soft  LUNGS: good air entry bilaterally, clear to auscultation, symmetric breath sounds, no wheezing or rhonchi appreciated  HEART: soft S1/S2, regular rate and rhythm, no murmurs noted, no lower extremity edema  GASTROINTESTINAL: abdomen is soft, nontender, nondistended, normoactive bowel sounds  INTEGUMENT: good skin turgor, warm skin, appears well perfused  MUSCULOSKELETAL: no clubbing or cyanosis, no obvious deformity  NEUROLOGIC: awake, alert, oriented x3, good muscle tone in all 4 extremities  HEME/LYMPH: no obvious ecchymosis or petechiae T(C): 37.6 (10-13-24 @ 19:53), Max: 38.8 (10-13-24 @ 17:15)  HR: 95 (10-13-24 @ 19:53) (95 - 122)  BP: 111/68 (10-13-24 @ 18:00) (107/76 - 111/68)  RR: 20 (10-13-24 @ 18:00) (20 - 22)  SpO2: 97% (10-13-24 @ 18:00) (95% - 97%)    GENERAL: patient appears well, no acute distress, appropriately interactive  EYES: sclera clear, no exudates  NECK: supple, soft  LUNGS: mild crackles b/l, good air flow, no wheezing or rhonchi  HEART: soft S1/S2, regular rate and rhythm, no murmurs noted, no lower extremity edema  GASTROINTESTINAL: abdomen is soft, nontender, nondistended, normoactive bowel sounds  INTEGUMENT: good skin turgor, warm skin, appears well perfused  MUSCULOSKELETAL: no clubbing or cyanosis, no obvious deformity  NEUROLOGIC: awake, alert, oriented x3, good muscle tone in all 4 extremities  HEME/LYMPH: no obvious ecchymosis or petechiae

## 2024-10-13 NOTE — ED PROVIDER NOTE - CLINICAL SUMMARY MEDICAL DECISION MAKING FREE TEXT BOX
Patient is a 64-year-old female with a history of anxiety, HTN, migraine, GI bleed, reactive airway disease.  Has been sick with a cold and cough for the past 4 days.  She had a fever at home last on Friday.  Taking Tylenol for the symptoms.  Her  was sick a week before with a URI but tested negative at home for COVID.  Today she was feeling more lethargic and weak.  With nausea.  So she went to urgent care and was directed to come to the emergency room for evaluation.    On evaluation is a pleasant well-developed well-nourished female no apparent distress.  HEENT is unremarkable.  Without G/F/R.  Neck is supple.  Sclera anicteric.  Cardiac exam is regular rate and rhythm.  Lung exam patient has soft adventitial sounds at the left posterior base.  With good aeration.  Abdomen is soft and nontender without guarding or rebound.  Musculoskeletal dam is normal.  Skin exam is normal.  Neurologic exam is normal.  Plan of care includes laboratory studies IV hydration nebulizer therapy ambulatory pulse ox.  Patient desaturates or has oxygen requirement require admission to the hospital for the COVID.

## 2024-10-13 NOTE — H&P ADULT - PROBLEM SELECTOR PLAN 1
- Pt COVID+ with possible PNA on CTA chest, requiring O2  - Vitals: BP: 107/76, HR: 122, Temp: 97.9 tmax 101.9, RR: 22, SpO2: 95% on nc   - Labs: wbc 18.15, d dimer 241, na 133, k 3, cl 92, lactate wnl, COVID +   - CTA: Bibasilar alveolar opacities suspicious for pneumonia. No evidence of pulmonary embolus. 1.6 cm aorticopulmonary window node, likely reactive.  - Received in the ED: ofirmev 1g, ceftriaxone 1g, azithro 500mg, solumedrol 125mg, potassium chloride 40meq po, NS bolus 2L, duoneb x 1  - F/u legionella, strep pneumo, mrsa  - F/u procal  - Will hold off on starting remdesivir for now given transaminitis  - Continuous pulse ox  - Remote tele  - ID consulted, Dr. Jerome, f/u recs - Pt COVID+ with possible PNA on CTA chest, requiring O2  - Vitals: BP: 107/76, HR: 122, Temp: 97.9 tmax 101.9, RR: 22, SpO2: 95% on nc   - Labs: wbc 18.15, d dimer 241, na 133, k 3, cl 92, lactate wnl, COVID +   - CTA: Bibasilar alveolar opacities suspicious for pneumonia. No evidence of pulmonary embolus. 1.6 cm aorticopulmonary window node, likely reactive.  - Received in the ED: ofirmev 1g, ceftriaxone 1g, azithro 500mg, solumedrol 125mg, potassium chloride 40meq po, NS bolus 2L, duoneb x 1  - F/u legionella, strep pneumo, mrsa  - F/u procal  - Will hold off on starting remdesivir for now given transaminitis  - Duonebs prn  - Continuous pulse ox  - Remote tele  - ID consulted, Dr. Jerome, f/u recs - Pt COVID+ with possible PNA on CTA chest, requiring O2  - Vitals: BP: 107/76, HR: 122, Temp: 97.9 tmax 101.9, RR: 22, SpO2: 95% on nc   - Labs: wbc 18.15, d dimer 241, na 133, k 3, cl 92, lactate wnl, COVID +   - CTA: Bibasilar alveolar opacities suspicious for pneumonia. No evidence of pulmonary embolus. 1.6 cm aorticopulmonary window node, likely reactive.  - Received in the ED: ofirmev 1g, ceftriaxone 1g, azithro 500mg, solumedrol 125mg, potassium chloride 40meq po, NS bolus 2L, duoneb x 1  - F/u legionella, strep pneumo, mrsa  - F/u procal  - Continue ceftriaxone + azithromycin  - Will hold off on starting remdesivir for now given transaminitis  - Duonebs prn  - Continuous pulse ox  - Remote tele  - ID consulted, Dr. Jerome, f/u recs - Pt with COVID pneumonia with possible superimposed bacterial pneumonia, requiring O2  - Vitals: BP: 107/76, HR: 122, Temp: 97.9 tmax 101.9, RR: 22, SpO2: 95% on nc   - Labs: wbc 18.15, plt 467, d dimer 241, lactate wnl, COVID +   - CTA: Bibasilar alveolar opacities suspicious for pneumonia. No evidence of pulmonary embolus. 1.6 cm aorticopulmonary window node, likely reactive.  - Received in the ED: ofirmev 1g, ceftriaxone 1g, azithro 500mg, solumedrol 125mg, potassium chloride 40meq po, NS bolus 2L, duoneb x 1  - F/u legionella, strep pneumo, mrsa  - F/u procal  - Continue ceftriaxone + azithromycin  - Start 6 mg decadron daily for 20 days - 9 days remaining, pt is s/p solumedrol in ED  - Will hold off on starting remdesivir for now given transaminitis - reassess in AM  - Robitussin q6 prn for cough  - Symbicort 160  - Albuterol inhaler q6 prn  - Continuous pulse ox  - Taper O2 as tolerated  - Remote tele  - ID consulted, Dr. Jerome, f/u recs - Pt with COVID pneumonia with possible superimposed bacterial pneumonia, requiring O2  - Vitals: BP: 107/76, HR: 122, Temp: 97.9 tmax 101.9, RR: 22, SpO2: 95% on nc   - Labs: wbc 18.15, plt 467, d dimer 241, lactate wnl, COVID +   - CTA: Bibasilar alveolar opacities suspicious for pneumonia. No evidence of pulmonary embolus. 1.6 cm aorticopulmonary window node, likely reactive.  - Received in the ED: ofirmev 1g, ceftriaxone 1g, azithro 500mg, solumedrol 125mg, potassium chloride 40meq po, NS bolus 2L, duoneb x 1  - F/u legionella, strep pneumo, mrsa  - F/u procal  - Continue ceftriaxone + azithromycin  - Pt with hx of asthma, possible exacerbation, start 6 mg decadron daily for 20 days - 9 days remaining, pt is s/p solumedrol in ED  - Will hold off on starting remdesivir for now given transaminitis - reassess in AM  - Robitussin q6 prn for cough  - Symbicort 160  - Albuterol inhaler q6 prn  - Continuous pulse ox  - Taper O2 as tolerated  - Remote tele  - ID consulted, Dr. Jerome, f/u recs - Pt with COVID pneumonia with possible superimposed bacterial pneumonia, requiring O2  - Vitals: BP: 107/76, HR: 122, Temp: 97.9 tmax 101.9, RR: 22, SpO2: 95% on nc   - Labs: wbc 18.15, plt 467, d dimer 241, lactate wnl, COVID +   - CTA: Bibasilar alveolar opacities suspicious for pneumonia. No evidence of pulmonary embolus. 1.6 cm aorticopulmonary window node, likely reactive.  - Received in the ED: ofirmev 1g, ceftriaxone 1g, azithro 500mg, solumedrol 125mg, potassium chloride 40meq po, NS bolus 2L, duoneb x 1  - F/u legionella, strep pneumo, mrsa  - F/u procal  - Continue ceftriaxone + azithromycin  - Pt with hx of asthma, possible exacerbation, start 6 mg decadron daily for 20 days - 9 days remaining, pt is s/p solumedrol in ED. Add pantoprazole given high dose steroids  - Will hold off on starting remdesivir for now given transaminitis - reassess in AM  - Robitussin q6 prn for cough  - Symbicort 160  - Albuterol inhaler q6 prn  - Continuous pulse ox  - Taper O2 as tolerated  - Remote tele  - ID consulted, Dr. Jerome, f/u recs - Pt with COVID pneumonia with possible superimposed bacterial pneumonia, requiring O2  - Vitals: BP: 107/76, HR: 122, Temp: 97.9 tmax 101.9, RR: 22, SpO2: 95% on nc   - Labs: wbc 18.15, plt 467, d dimer 241, lactate wnl, COVID +   - CTA: Bibasilar alveolar opacities suspicious for pneumonia. No evidence of pulmonary embolus. 1.6 cm aorticopulmonary window node, likely reactive.  - Received in the ED: ofirmev 1g, ceftriaxone 1g, azithro 500mg, solumedrol 125mg, potassium chloride 40meq po, NS bolus 2L, duoneb x 1  - F/u legionella, strep pneumo, mrsa  - F/u procal  - Continue ceftriaxone + azithromycin - pt with penicillin allergy but has tolerated ceftriazone/azithro previously  - Pt with hx of asthma, possible exacerbation, start 6 mg decadron daily for 20 days - 9 days remaining, pt is s/p solumedrol in ED. Add pantoprazole given high dose steroids  - Will hold off on starting remdesivir for now given transaminitis - reassess in AM  - Robitussin q6 prn for cough  - Symbicort 160  - Albuterol inhaler q6 prn  - Continuous pulse ox  - Taper O2 as tolerated  - Remote tele  - ID consulted, Dr. Jerome, f/u recs

## 2024-10-13 NOTE — ED ADULT TRIAGE NOTE - CHIEF COMPLAINT QUOTE
pt A&Ox4 ambulatory from triage sent by urgent care with complaints of fever and cough x 4 days. tachy in 120s. o2 sat 91% placed on 2L NC now 95%

## 2024-10-13 NOTE — ED PROVIDER NOTE - CARE PLAN
Principal Discharge DX:	Pneumonia  Secondary Diagnosis:	COVID  Secondary Diagnosis:	COPD with hypoxia   1

## 2024-10-13 NOTE — H&P ADULT - PROBLEM SELECTOR PLAN 3
- DVT ppx - DVT ppx lovenox - Chronic  - Continue home amlodipine - Chronic  -  hold amlodipine as BP on soft side 110s - Pt with Na 133, k 3, cl 92  - Pt is s/p 2L NS bolus  - F/u uric acid, tsh, t4, urine lytes  - Asymptomatic, trend CMP  - Replete prn

## 2024-10-13 NOTE — H&P ADULT - NSICDXPASTMEDICALHX_GEN_ALL_CORE_FT
PAST MEDICAL HISTORY:  Anxiety     Asthma     GI bleed     HTN (hypertension)     HTN (hypertension)     Migraine     Migraine

## 2024-10-13 NOTE — H&P ADULT - HISTORY OF PRESENT ILLNESS
Patient is a 64-year-old female with a history of asthma, HTN, anxiety, migraine, GI bleed (2019), reporting that she has been sick with a cold and cough for the past 4 days.  She had a fever at home last on Friday.  Taking Tylenol for the symptoms.  Her  was sick a week before with a URI but tested negative at home for COVID.  Today she was feeling more lethargic and weak.  With nausea.  So she went to urgent care and was directed to come to the emergency room for evaluation.    ED Course:   Vitals: BP: 107/76, HR: 122, Temp: 97.9 tmax 101.9, RR: 22, SpO2: 95% on nc   Labs: wbc 18.15, d dimer 241, na 133, k 3, cl 92, alb 2.7, alk phos 304, ast 94, alt 108, lactate wnl, COVID +   CTA: IMPRESSION:  Bibasilar alveolar opacities suspicious for pneumonia.  No evidence of pulmonary embolus.  1.6 cm aorticopulmonary window node, likely reactive.  EKG:   Received in the ED: ofirmev 1g, ceftriaxone 1g, azithro 500mg, solumedrol 125mg, potassium chloride 40meq po, NS bolus 2L, duoneb x 1   Patient is a 64-year-old female with a history of asthma, HTN, anxiety, migraine, GI bleed (2019), reporting that she has been sick with a cold and cough for the past 4 days.  She had a fever at home last on Friday.  Taking Tylenol for the symptoms.  Her  was sick a week before with a URI but tested negative at home for COVID.  Today she was feeling more lethargic and weak with nausea.  So she went to urgent care and was directed to come to the emergency room for evaluation.    ED Course:   Vitals: BP: 107/76, HR: 122, Temp: 97.9 tmax 101.9, RR: 22, SpO2: 95% on nc   Labs: wbc 18.15, d dimer 241, na 133, k 3, cl 92, alb 2.7, alk phos 304, ast 94, alt 108, lactate wnl, COVID +   CTA: IMPRESSION:  Bibasilar alveolar opacities suspicious for pneumonia.  No evidence of pulmonary embolus.  1.6 cm aorticopulmonary window node, likely reactive.  EKG:   Received in the ED: ofirmev 1g, ceftriaxone 1g, azithro 500mg, solumedrol 125mg, potassium chloride 40meq po, NS bolus 2L, duoneb x 1   Patient is a 64-year-old female with a history of asthma, HTN, anxiety, migraine, GI bleed (2019), reporting that she has been sick with a cold and cough for the past 4 days. She had a fever at home last on Friday.  Taking Tylenol for the symptoms.  Her  was sick a week before with a URI but tested negative at home for COVID.  Today she was feeling more lethargic and weak with nausea.  So she went to urgent care and was directed to come to the emergency room for evaluation. Denies current fevers, chills, cough, CP, SOB, abd pain, n/v/d.    ED Course:   Vitals: BP: 107/76, HR: 122, Temp: 97.9 tmax 101.9, RR: 22, SpO2: 95% on nc   Labs: wbc 18.15, d dimer 241, na 133, k 3, cl 92, alb 2.7, alk phos 304, ast 94, alt 108, lactate wnl, COVID +   CTA: IMPRESSION:  Bibasilar alveolar opacities suspicious for pneumonia.  No evidence of pulmonary embolus.  1.6 cm aorticopulmonary window node, likely reactive.  EKG: sinus tach  Received in the ED: ofirmev 1g, ceftriaxone 1g, azithro 500mg, solumedrol 125mg, potassium chloride 40meq po, NS bolus 2L, duoneb x 1   Patient is a 64-year-old female with a history of asthma, HTN, anxiety, migraine, GI bleed (2019), reporting that she has been sick with a cold and cough for the past 4 days. She had a fever at home last on Friday.  Taking Tylenol for the symptoms.  Her  was sick a week before with a URI but tested negative at home for COVID.  Today she was feeling more lethargic and weak with nausea.  So she went to urgent care and was directed to come to the emergency room for evaluation. Denies current fevers, chills, cough, CP, SOB, abd pain, n/v/d.    ED Course:   Vitals: BP: 107/76, HR: 122, Temp: 97.9 tmax 101.9, RR: 22, SpO2: 95% on nc   Labs: wbc 18.15, plt 467, d dimer 241, na 133, k 3, cl 92, alb 2.7, alk phos 304, ast 94, alt 108, lactate wnl, COVID +   CTA: IMPRESSION:  Bibasilar alveolar opacities suspicious for pneumonia.  No evidence of pulmonary embolus.  1.6 cm aorticopulmonary window node, likely reactive.  EKG: sinus tach  Received in the ED: ofirmev 1g, ceftriaxone 1g, azithro 500mg, solumedrol 125mg, potassium chloride 40meq po, NS bolus 2L, duoneb x 1

## 2024-10-14 ENCOUNTER — RX RENEWAL (OUTPATIENT)
Age: 64
End: 2024-10-14

## 2024-10-14 LAB
A1C WITH ESTIMATED AVERAGE GLUCOSE RESULT: 5.7 % — HIGH (ref 4–5.6)
ALBUMIN SERPL ELPH-MCNC: 2.4 G/DL — LOW (ref 3.3–5)
ALP SERPL-CCNC: 276 U/L — HIGH (ref 40–120)
ALT FLD-CCNC: 97 U/L — HIGH (ref 12–78)
ANION GAP SERPL CALC-SCNC: 8 MMOL/L — SIGNIFICANT CHANGE UP (ref 5–17)
APPEARANCE UR: CLEAR — SIGNIFICANT CHANGE UP
AST SERPL-CCNC: 68 U/L — HIGH (ref 15–37)
BASOPHILS # BLD AUTO: 0.16 K/UL — SIGNIFICANT CHANGE UP (ref 0–0.2)
BASOPHILS NFR BLD AUTO: 1 % — SIGNIFICANT CHANGE UP (ref 0–2)
BILIRUB SERPL-MCNC: 0.5 MG/DL — SIGNIFICANT CHANGE UP (ref 0.2–1.2)
BILIRUB UR-MCNC: NEGATIVE — SIGNIFICANT CHANGE UP
BUN SERPL-MCNC: 12 MG/DL — SIGNIFICANT CHANGE UP (ref 7–23)
CALCIUM SERPL-MCNC: 9 MG/DL — SIGNIFICANT CHANGE UP (ref 8.5–10.1)
CHLORIDE SERPL-SCNC: 103 MMOL/L — SIGNIFICANT CHANGE UP (ref 96–108)
CHOLEST SERPL-MCNC: 112 MG/DL — SIGNIFICANT CHANGE UP
CO2 SERPL-SCNC: 26 MMOL/L — SIGNIFICANT CHANGE UP (ref 22–31)
COLOR SPEC: YELLOW — SIGNIFICANT CHANGE UP
CREAT ?TM UR-MCNC: 92 MG/DL — SIGNIFICANT CHANGE UP
CREAT SERPL-MCNC: 0.58 MG/DL — SIGNIFICANT CHANGE UP (ref 0.5–1.3)
DIFF PNL FLD: NEGATIVE — SIGNIFICANT CHANGE UP
EGFR: 100 ML/MIN/1.73M2 — SIGNIFICANT CHANGE UP
EOSINOPHIL # BLD AUTO: 0 K/UL — SIGNIFICANT CHANGE UP (ref 0–0.5)
EOSINOPHIL NFR BLD AUTO: 0 % — SIGNIFICANT CHANGE UP (ref 0–6)
ESTIMATED AVERAGE GLUCOSE: 117 MG/DL — HIGH (ref 68–114)
GLUCOSE SERPL-MCNC: 166 MG/DL — HIGH (ref 70–99)
GLUCOSE UR QL: NEGATIVE MG/DL — SIGNIFICANT CHANGE UP
HCT VFR BLD CALC: 35.1 % — SIGNIFICANT CHANGE UP (ref 34.5–45)
HDLC SERPL-MCNC: 28 MG/DL — LOW
HGB BLD-MCNC: 12.1 G/DL — SIGNIFICANT CHANGE UP (ref 11.5–15.5)
KETONES UR-MCNC: ABNORMAL MG/DL
LEUKOCYTE ESTERASE UR-ACNC: ABNORMAL
LIPID PNL WITH DIRECT LDL SERPL: 69 MG/DL — SIGNIFICANT CHANGE UP
LYMPHOCYTES # BLD AUTO: 0.79 K/UL — LOW (ref 1–3.3)
LYMPHOCYTES # BLD AUTO: 5 % — LOW (ref 13–44)
MAGNESIUM SERPL-MCNC: 2.5 MG/DL — SIGNIFICANT CHANGE UP (ref 1.6–2.6)
MCHC RBC-ENTMCNC: 33.2 PG — SIGNIFICANT CHANGE UP (ref 27–34)
MCHC RBC-ENTMCNC: 34.5 GM/DL — SIGNIFICANT CHANGE UP (ref 32–36)
MCV RBC AUTO: 96.2 FL — SIGNIFICANT CHANGE UP (ref 80–100)
MONOCYTES # BLD AUTO: 0.48 K/UL — SIGNIFICANT CHANGE UP (ref 0–0.9)
MONOCYTES NFR BLD AUTO: 3 % — SIGNIFICANT CHANGE UP (ref 2–14)
NEUTROPHILS # BLD AUTO: 13.96 K/UL — HIGH (ref 1.8–7.4)
NEUTROPHILS NFR BLD AUTO: 81 % — HIGH (ref 43–77)
NITRITE UR-MCNC: NEGATIVE — SIGNIFICANT CHANGE UP
NON HDL CHOLESTEROL: 85 MG/DL — SIGNIFICANT CHANGE UP
NRBC # BLD: SIGNIFICANT CHANGE UP /100 WBCS (ref 0–0)
OSMOLALITY UR: 497 MOSM/KG — SIGNIFICANT CHANGE UP (ref 50–1200)
PH UR: 6 — SIGNIFICANT CHANGE UP (ref 5–8)
PHOSPHATE SERPL-MCNC: 3.1 MG/DL — SIGNIFICANT CHANGE UP (ref 2.5–4.5)
PLATELET # BLD AUTO: 449 K/UL — HIGH (ref 150–400)
POTASSIUM SERPL-MCNC: 3.7 MMOL/L — SIGNIFICANT CHANGE UP (ref 3.5–5.3)
POTASSIUM SERPL-SCNC: 3.7 MMOL/L — SIGNIFICANT CHANGE UP (ref 3.5–5.3)
POTASSIUM UR-SCNC: 40.6 MMOL/L — SIGNIFICANT CHANGE UP
PROT ?TM UR-MCNC: 155 MG/DL — HIGH (ref 0–12)
PROT SERPL-MCNC: 6.9 G/DL — SIGNIFICANT CHANGE UP (ref 6–8.3)
PROT UR-MCNC: 30 MG/DL
PROT/CREAT UR-RTO: 1.7 RATIO — HIGH (ref 0–0.2)
RBC # BLD: 3.65 M/UL — LOW (ref 3.8–5.2)
RBC # FLD: 12.7 % — SIGNIFICANT CHANGE UP (ref 10.3–14.5)
SODIUM SERPL-SCNC: 137 MMOL/L — SIGNIFICANT CHANGE UP (ref 135–145)
SODIUM UR-SCNC: <20 MMOL/L — SIGNIFICANT CHANGE UP
SP GR SPEC: 1.02 — SIGNIFICANT CHANGE UP (ref 1–1.03)
T4 AB SER-ACNC: 8.6 UG/DL — SIGNIFICANT CHANGE UP (ref 4.6–12)
TRIGL SERPL-MCNC: 78 MG/DL — SIGNIFICANT CHANGE UP
TSH SERPL-MCNC: 0.3 UIU/ML — LOW (ref 0.36–3.74)
UROBILINOGEN FLD QL: 1 MG/DL — SIGNIFICANT CHANGE UP (ref 0.2–1)
UUN UR-MCNC: 848 MG/DL — SIGNIFICANT CHANGE UP
WBC # BLD: 15.86 K/UL — HIGH (ref 3.8–10.5)
WBC # FLD AUTO: 15.86 K/UL — HIGH (ref 3.8–10.5)

## 2024-10-14 PROCEDURE — 99233 SBSQ HOSP IP/OBS HIGH 50: CPT

## 2024-10-14 PROCEDURE — 99223 1ST HOSP IP/OBS HIGH 75: CPT

## 2024-10-14 PROCEDURE — 76705 ECHO EXAM OF ABDOMEN: CPT | Mod: 26

## 2024-10-14 RX ORDER — REMDESIVIR 5 MG/ML
100 INJECTION INTRAVENOUS EVERY 24 HOURS
Refills: 0 | Status: DISCONTINUED | OUTPATIENT
Start: 2024-10-15 | End: 2024-10-16

## 2024-10-14 RX ORDER — REMDESIVIR 5 MG/ML
200 INJECTION INTRAVENOUS EVERY 24 HOURS
Refills: 0 | Status: COMPLETED | OUTPATIENT
Start: 2024-10-14 | End: 2024-10-14

## 2024-10-14 RX ORDER — REMDESIVIR 5 MG/ML
INJECTION INTRAVENOUS
Refills: 0 | Status: DISCONTINUED | OUTPATIENT
Start: 2024-10-14 | End: 2024-10-16

## 2024-10-14 RX ADMIN — REMDESIVIR 200 MILLIGRAM(S): 5 INJECTION INTRAVENOUS at 11:44

## 2024-10-14 RX ADMIN — AZITHROMYCIN 255 MILLIGRAM(S): 250 TABLET, FILM COATED ORAL at 18:39

## 2024-10-14 RX ADMIN — Medication 1 DOSE(S): at 18:38

## 2024-10-14 RX ADMIN — Medication 20 MILLIEQUIVALENT(S): at 05:32

## 2024-10-14 RX ADMIN — Medication 1 DOSE(S): at 11:44

## 2024-10-14 RX ADMIN — Medication 6 MILLIGRAM(S): at 05:32

## 2024-10-14 RX ADMIN — Medication 2 PUFF(S): at 11:45

## 2024-10-14 RX ADMIN — Medication 40 MILLIGRAM(S): at 11:43

## 2024-10-14 RX ADMIN — Medication 15 MILLILITER(S): at 11:45

## 2024-10-14 RX ADMIN — PANTOPRAZOLE SODIUM 40 MILLIGRAM(S): 40 TABLET, DELAYED RELEASE ORAL at 05:32

## 2024-10-14 RX ADMIN — ENOXAPARIN SODIUM 40 MILLIGRAM(S): 150 INJECTION SUBCUTANEOUS at 05:32

## 2024-10-14 RX ADMIN — Medication 100 MILLIGRAM(S): at 18:38

## 2024-10-14 NOTE — CARE COORDINATION ASSESSMENT. - NSCAREPROVIDERS_GEN_ALL_CORE_FT
CARE PROVIDERS:  Accepting Physician: Eliseo Beltran  Administration: Sabrina Montgomery  Admitting: Eliseo Beltran  Attending: Hans Teresa  Consultant: Melchor Jerome  Covering Team: Eliseo Beltran  Covering Team: Gris Calvillo ED ACP: Lucas Erwin  ED Attending: Blake Bellamy  ED Nurse: Shannon Luna  Infection Control: George Hinkle  Nurse: Lorraine Ramos  Nurse: Shannon Luna  Nurse: Radha Pandya  Nurse: Anderson Beltrán  Nurse: Torin Connell  Outpatient Provider: Devika Amador  Outpatient Provider: Tyler Metz  PCA/Nursing Assistant: Madison Patel  PCA/Nursing Assistant: Ana Nguyen  Radiology Technician: Bernie Hayes  Respiratory Therapy: Isaiah Barillas  : Lisa Barton  Team: [email or web address] [facility], Team  [facility]// [employer] Assoc.: Zainab Castañeda

## 2024-10-14 NOTE — CARE COORDINATION ASSESSMENT. - NSPASTMEDSURGHISTORY_GEN_ALL_CORE_FT
PAST MEDICAL & SURGICAL HISTORY:  HTN (hypertension)      Migraine      Asthma      History of appendectomy      Migraine      Anxiety      HTN (hypertension)      GI bleed      S/P appendectomy

## 2024-10-14 NOTE — CONSULT NOTE ADULT - REASON FOR ADMISSION
COVID PNA with possible superimposed bacterial pneumonia.
COVID PNA with possible superimposed bacterial pneumonia.

## 2024-10-14 NOTE — PROGRESS NOTE ADULT - SUBJECTIVE AND OBJECTIVE BOX
Patient is a 64y old  Female who presents with a chief complaint of cough and shortness of breath.       INTERVAL HPI/OVERNIGHT EVENTS: Pt states cough and shortness of breath are improving. Denies fever, chills, CP, abd pain, n/v, dysuria.     MEDICATIONS  (STANDING):  azithromycin  IVPB 500 milliGRAM(s) IV Intermittent every 24 hours  cefTRIAXone   IVPB 1000 milliGRAM(s) IV Intermittent every 24 hours  dexAMETHasone     Tablet 6 milliGRAM(s) Oral daily  DULoxetine 40 milliGRAM(s) Oral daily  enoxaparin Injectable 40 milliGRAM(s) SubCutaneous every 24 hours  fluticasone propionate/ salmeterol 250-50 MICROgram(s) Diskus 1 Dose(s) Inhalation two times a day  pantoprazole    Tablet 40 milliGRAM(s) Oral before breakfast  remdesivir  IVPB 100 milliGRAM(s) IV Intermittent every 24 hours  remdesivir  IVPB   IV Intermittent     MEDICATIONS  (PRN):  acetaminophen     Tablet .. 650 milliGRAM(s) Oral every 6 hours PRN Temp greater or equal to 38C (100.4F), Mild Pain (1 - 3)  albuterol    90 MICROgram(s) HFA Inhaler 2 Puff(s) Inhalation every 6 hours PRN Bronchospasm  guaifenesin/dextromethorphan Oral Liquid 15 milliLiter(s) Oral every 6 hours PRN Cough  melatonin 3 milliGRAM(s) Oral at bedtime PRN Insomnia      Allergies    penicillin (Hives; Swelling)  Allergic to melons (Rash)  latex (Rash)  adhesives (Rash)  penicillin (Hives)    Intolerances        REVIEW OF SYSTEMS:  CONSTITUTIONAL: +fatigue; No fever or chills  HEENT:  No headache, no sore throat  RESPIRATORY: cough and shortness of breath are improving  CARDIOVASCULAR: No chest pain, palpitations  GASTROINTESTINAL: No abd pain, nausea, vomiting, or diarrhea  GENITOURINARY: No dysuria, frequency, or hematuria  NEUROLOGICAL: no focal weakness or dizziness  MUSCULOSKELETAL: no myalgias     Vital Signs Last 24 Hrs  T(C): 36.3 (14 Oct 2024 12:45), Max: 38.8 (13 Oct 2024 17:15)  T(F): 97.3 (14 Oct 2024 12:45), Max: 101.9 (13 Oct 2024 17:15)  HR: 86 (14 Oct 2024 12:45) (83 - 122)  BP: 105/60 (14 Oct 2024 12:45) (105/60 - 114/68)  BP(mean): --  RR: 19 (14 Oct 2024 12:45) (19 - 22)  SpO2: 94% (14 Oct 2024 12:45) (93% - 97%)    O2 Parameters below as of 14 Oct 2024 12:45  Patient On (Oxygen Delivery Method): nasal cannula  O2 Flow (L/min): 2      PHYSICAL EXAM:  GENERAL: NAD at rest on NC  HEENT:  anicteric, moist mucous membranes  CHEST/LUNG:  coarse breath sounds b/l   HEART:  RRR, S1, S2  ABDOMEN:  BS+, soft, nontender, nondistended  EXTREMITIES: no edema, cyanosis, or calf tenderness  NERVOUS SYSTEM: answers questions and follows commands appropriately    LABS:                          12.1   15.86 )-----------( 449      ( 14 Oct 2024 06:27 )             35.1     CBC Full  -  ( 14 Oct 2024 06:27 )  WBC Count : 15.86 K/uL  Hemoglobin : 12.1 g/dL  Hematocrit : 35.1 %  Platelet Count - Automated : 449 K/uL  Mean Cell Volume : 96.2 fl  Mean Cell Hemoglobin : 33.2 pg  Mean Cell Hemoglobin Concentration : 34.5 gm/dL  Auto Neutrophil # : 13.96 K/uL  Auto Lymphocyte # : 0.79 K/uL  Auto Monocyte # : 0.48 K/uL  Auto Eosinophil # : 0.00 K/uL  Auto Basophil # : 0.16 K/uL  Auto Neutrophil % : 81.0 %  Auto Lymphocyte % : 5.0 %  Auto Monocyte % : 3.0 %  Auto Eosinophil % : 0.0 %  Auto Basophil % : 1.0 %    10-14    137  |  103  |  12  ----------------------------<  166[H]  3.7   |  26  |  0.58    Ca    9.0      14 Oct 2024 06:27  Phos  3.1     10-14  Mg     2.5     10-14    TPro  6.9  /  Alb  2.4[L]  /  TBili  0.5  /  DBili  x   /  AST  68[H]  /  ALT  97[H]  /  AlkPhos  276[H]  10-14      CAPILLARY BLOOD GLUCOSE      RADIOLOGY & ADDITIONAL TESTS:    Personally reviewed.     Consultant(s) Notes Reviewed:  [x] YES  [ ] NO

## 2024-10-14 NOTE — CARE COORDINATION ASSESSMENT. - OTHER PERTINENT DISCHARGE PLANNING INFORMATION:
Met with patient at bedside to discuss the role of case management with verbalized understanding.  Patient seen in ED and anticipate inpatient admission for Covid +.  Resides home with spouse, has a cane at residence if needed, denies [facility] services.  Will monitor for transition needs.  PCP Dr Ivette Roberto

## 2024-10-14 NOTE — CONSULT NOTE ADULT - SUBJECTIVE AND OBJECTIVE BOX
Date/Time Patient Seen:  		  Referring MD:   Data Reviewed	       Patient is a 64y old  Female who presents with a chief complaint of COVID PNA with possible superimposed bacterial pneumonia. (14 Oct 2024 12:28)      Subjective/HPI   Patient is a 64-year-old female with a history of asthma, HTN, anxiety, migraine, GI bleed (2019), reporting that she has been sick with a cold and cough for the past 4 days. She had a fever at home last on Friday.  Taking Tylenol for the symptoms.  Her  was sick a week before with a URI but tested negative at home for COVID.  Today she was feeling more lethargic and weak with nausea.  So she went to urgent care and was directed to come to the emergency room for evaluation. Denies current fevers, chills, cough, CP, SOB, abd pain, n/v/d.    ED Course:   Vitals: BP: 107/76, HR: 122, Temp: 97.9 tmax 101.9, RR: 22, SpO2: 95% on nc   Labs: wbc 18.15, plt 467, d dimer 241, na 133, k 3, cl 92, alb 2.7, alk phos 304, ast 94, alt 108, lactate wnl, COVID +   CTA: IMPRESSION:  Bibasilar alveolar opacities suspicious for pneumonia.  No evidence of pulmonary embolus.  1.6 cm aorticopulmonary window node, likely reactive.  EKG: sinus tach  Received in the ED: ofirmev 1g, ceftriaxone 1g, azithro 500mg, solumedrol 125mg, potassium chloride 40meq po, NS bolus 2L, duoneb x 1  PAST MEDICAL & SURGICAL HISTORY:  Asthma    Migraine    HTN (hypertension)    GI bleed    HTN (hypertension)    Anxiety    Migraine    History of appendectomy    S/P appendectomy       Review of Systems:  Review of Systems: CONSTITUTIONAL:  +fevers, denies chills  SKIN: denies new lesions, rash  CARDIOVASCULAR: denies chest pain, chest pressure, palpitations  RESPIRATORY: denies shortness of breath, +cough, +sputum production  GASTROINTESTINAL: +nausea, denies vomiting, diarrhea, abdominal pain, melena or hematochezia  GENITOURINARY: denies dysuria, discharge  NEUROLOGICAL: denies numbness, headache, focal weakness  MUSCULOSKELETAL: denies new joint pain, muscle aches  HEMATOLOGIC: denies gross bleeding, bruising  Other Review of Systems: All other review of systems negative, except as noted in HPI      Allergies and Intolerances:        Allergies:  	penicillin: Drug, Hives  	penicillin: Drug, Hives, Swelling  	latex: Latex, Rash  	Allergic to melons [freetext allergy; no alerts]: Food, Rash, Allergic to melons  	adhesives: Miscellaneous, Rash    Home Medications:   * Patient Currently Takes Medications as of 13-Oct-2024 22:28 documented in Structured Notes  · 	amLODIPine 5 mg oral tablet: Last Dose Taken:  , 1 tab(s) orally once a day  · 	Albuterol (Eqv-Proventil HFA) 90 mcg/inh inhalation aerosol: Last Dose Taken:  , 2 puff(s) inhaled once a day as needed for  shortness of breath and/or wheezing  · 	DULoxetine 40 mg oral delayed release capsule: Last Dose Taken:  , 1 cap(s) orally once a day    .    Patient History:    Past Medical, Past Surgical, and Family History:  PAST MEDICAL HISTORY:  Anxiety     Asthma     GI bleed     HTN (hypertension)     HTN (hypertension)     Migraine     Migraine.     PAST SURGICAL HISTORY:  History of appendectomy     S/P appendectomy.     Social History:  · Substance use	Yes  · Alcohol use	socially  · Substance use	none  · Tobacco use	none  · Social History (marital status, living situation, occupation, and sexual history)	independent, no dietary restrictions     Tobacco Screening:  · Core Measure Site	Yes  · Has the patient used tobacco in the past 30 days?	No    Risk Assessment:    Present on Admission:  Deep Venous Thrombosis	no  Pulmonary Embolus	no     HIV Screening:  · In accordance with [facility] law, we offer every patient who comes to our ED an HIV test. Would you like to be tested today?	Opt out     Hepatitis C Test Questions:  · In accordance with [place] State Law, we offer every patient a Hepatitis C test. Would you like to be tested today?	Opt out        Medication list         MEDICATIONS  (STANDING):  azithromycin  IVPB 500 milliGRAM(s) IV Intermittent every 24 hours  cefTRIAXone   IVPB 1000 milliGRAM(s) IV Intermittent every 24 hours  dexAMETHasone     Tablet 6 milliGRAM(s) Oral daily  DULoxetine 40 milliGRAM(s) Oral daily  enoxaparin Injectable 40 milliGRAM(s) SubCutaneous every 24 hours  fluticasone propionate/ salmeterol 250-50 MICROgram(s) Diskus 1 Dose(s) Inhalation two times a day  pantoprazole    Tablet 40 milliGRAM(s) Oral before breakfast  remdesivir  IVPB   IV Intermittent     MEDICATIONS  (PRN):  acetaminophen     Tablet .. 650 milliGRAM(s) Oral every 6 hours PRN Temp greater or equal to 38C (100.4F), Mild Pain (1 - 3)  albuterol    90 MICROgram(s) HFA Inhaler 2 Puff(s) Inhalation every 6 hours PRN Bronchospasm  guaifenesin/dextromethorphan Oral Liquid 15 milliLiter(s) Oral every 6 hours PRN Cough  melatonin 3 milliGRAM(s) Oral at bedtime PRN Insomnia         Vitals log        ICU Vital Signs Last 24 Hrs  T(C): 36.3 (14 Oct 2024 12:45), Max: 38.8 (13 Oct 2024 17:15)  T(F): 97.3 (14 Oct 2024 12:45), Max: 101.9 (13 Oct 2024 17:15)  HR: 86 (14 Oct 2024 12:45) (83 - 122)  BP: 105/60 (14 Oct 2024 12:45) (105/60 - 114/68)  BP(mean): --  ABP: --  ABP(mean): --  RR: 19 (14 Oct 2024 12:45) (19 - 22)  SpO2: 94% (14 Oct 2024 12:45) (93% - 97%)    O2 Parameters below as of 14 Oct 2024 12:45  Patient On (Oxygen Delivery Method): nasal cannula  O2 Flow (L/min): 2               Input and Output:  I&O's Detail      Lab Data                        12.1   15.86 )-----------( 449      ( 14 Oct 2024 06:27 )             35.1     10-14    137  |  103  |  12  ----------------------------<  166[H]  3.7   |  26  |  0.58    Ca    9.0      14 Oct 2024 06:27  Phos  3.1     10-14  Mg     2.5     10-14    TPro  6.9  /  Alb  2.4[L]  /  TBili  0.5  /  DBili  x   /  AST  68[H]  /  ALT  97[H]  /  AlkPhos  276[H]  10-14            Review of Systems	    weakness  o2 support  cough  sob  bravo  alert  verbal  all systems reviewed      Objective     Physical Examination  heart s1s2  lung dc BS  head nc  head at  abd soft  cn grossly int  moves all extr        Pertinent Lab findings & Imaging      Mathias:  NO   Adequate UO     I&O's Detail           Discussed with:     Cultures:	        Radiology          ACC: [id number] EXAM:  CT ANGIO CHEST PULM ART WAWIC   ORDERED BY: SARAH CURTIS     PROCEDURE DATE:  10/13/2024          INTERPRETATION:  CLINICAL INFORMATION: Shortness of breath    COMPARISON: 6/27/2023    CONTRAST/COMPLICATIONS:  IV Contrast: Omnipaque 350  70 cc administered   30 cc discarded  Oral Contrast: NONE  Complications: None reported at time of study completion    PROCEDURE:  CT Angiography of the Chest.  Sagittal and coronal reformats were performed as well as 3D (MIP)   reconstructions.    FINDINGS:    LUNGS AND LARGE AIRWAYS: Patent central airways. Bibasilar alveolar   opacities suspicious for pneumonia  PLEURA: No pleural effusion.  VESSELS: Within normal limits.  HEART: Heart size is normal. No pericardial effusion.  MEDIASTINUM AND ELIZABETH: 1.6 cm aorticopulmonary window node 1.7 x 1.1 cm   subcarinal node. Other smaller nodes in the right paratracheal space   superiorly  CHEST WALL AND LOWER NECK: Within normal limits.  VISUALIZED UPPER ABDOMEN: Gallbladder contracted with stones  BONES: Within normal limits.    IMPRESSION:  Bibasilar alveolar opacities suspicious for pneumonia.  No evidence of pulmonary embolus.  1.6 cm aorticopulmonary window node, likely reactive.        --- End of Report ---            FRAN SMITH MD; Attending Radiologist  This document has been electronically signed. Oct 13 2024  8:07PM                    
[facility]  INFECTIOUS DISEASES   [address] [place] [address]  Tel: [phone]     Fax: [phone]  ========================================================  MD Luis Barnett Michelle, MD Shah, Kaushal, MD Sunjit, Jaspal, MD Sehrish Shahid, MD   ========================================================    MRN-[id number]  MARIA A JIMENES     CC: Patient is a 64y old  Female who presents with a chief complaint of COVID PNA with possible superimposed bacterial pneumonia. (13 Oct 2024 21:06)    HPI:  Patient is a 64-year-old female with a history of asthma, HTN, anxiety, migraine, GI bleed (2019), reporting that she has been sick with a cold and cough for the past 4 days. She had a fever at home last on Friday.  Taking Tylenol for the symptoms.  Her  was sick a week before with a URI but tested negative at home for COVID.  Today she was feeling more lethargic and weak with nausea.  So she went to urgent care and was directed to come to the emergency room for evaluation. Denies current fevers, chills, cough, CP, SOB, abd pain, n/v/d.    ED Course:   Vitals: BP: 107/76, HR: 122, Temp: 97.9 tmax 101.9, RR: 22, SpO2: 95% on nc   Labs: wbc 18.15, plt 467, d dimer 241, na 133, k 3, cl 92, alb 2.7, alk phos 304, ast 94, alt 108, lactate wnl, COVID +   CTA: IMPRESSION:  Bibasilar alveolar opacities suspicious for pneumonia.  No evidence of pulmonary embolus.  1.6 cm aorticopulmonary window node, likely reactive.  EKG: sinus tach  Received in the ED: ofirmev 1g, ceftriaxone 1g, azithro 500mg, solumedrol 125mg, potassium chloride 40meq po, NS bolus 2L, duoneb x 1   (13 Oct 2024 21:06)    PAST MEDICAL & SURGICAL HISTORY:  Asthma  HTN (hypertension)  GI bleed  Anxiety  Migraine  History of appendectomy  S/P appendectomy    Social Hx: No current smoking, EtOH or drugs     FAMILY HISTORY:  Noncontributory     Allergies  penicillin (Hives; Swelling)  Allergic to melons (Rash)  latex (Rash)  adhesives (Rash)  penicillin (Hives)    MEDICATIONS  (STANDING):  azithromycin  IVPB 500 milliGRAM(s) IV Intermittent every 24 hours  cefTRIAXone   IVPB 1000 milliGRAM(s) IV Intermittent every 24 hours  dexAMETHasone     Tablet 6 milliGRAM(s) Oral daily  DULoxetine 40 milliGRAM(s) Oral daily  enoxaparin Injectable 40 milliGRAM(s) SubCutaneous every 24 hours  fluticasone propionate/ salmeterol 250-50 MICROgram(s) Diskus 1 Dose(s) Inhalation two times a day  pantoprazole    Tablet 40 milliGRAM(s) Oral before breakfast  remdesivir  IVPB   IV Intermittent     MEDICATIONS  (PRN):  acetaminophen     Tablet .. 650 milliGRAM(s) Oral every 6 hours PRN Temp greater or equal to 38C (100.4F), Mild Pain (1 - 3)  albuterol    90 MICROgram(s) HFA Inhaler 2 Puff(s) Inhalation every 6 hours PRN Bronchospasm  guaifenesin/dextromethorphan Oral Liquid 15 milliLiter(s) Oral every 6 hours PRN Cough  melatonin 3 milliGRAM(s) Oral at bedtime PRN Insomnia     REVIEW OF SYSTEMS:  CONSTITUTIONAL:  No Fever or chills  HEENT:  No diplopia or blurred vision.  No sore throat or runny nose.  CARDIOVASCULAR:  No chest pain   RESPIRATORY:  +cough, +shortness of breath  GASTROINTESTINAL:  No nausea, vomiting or diarrhea.  GENITOURINARY:  No dysuria, frequency or urgency. No Blood in urine  MUSCULOSKELETAL:  no joint aches, no muscle pain  SKIN:  No change in skin, hair or nails.    Physical Exam:  Vital Signs Last 24 Hrs  T(C): 36.3 (14 Oct 2024 08:35), Max: 38.8 (13 Oct 2024 17:15)  T(F): 97.4 (14 Oct 2024 08:35), Max: 101.9 (13 Oct 2024 17:15)  HR: 88 (14 Oct 2024 08:35) (83 - 122)  BP: 114/68 (14 Oct 2024 08:35) (107/76 - 114/68)  BP(mean): --  RR: 19 (14 Oct 2024 08:35) (19 - 22)  SpO2: 93% (14 Oct 2024 08:35) (93% - 97%)  Parameters below as of 14 Oct 2024 08:35  Patient On (Oxygen Delivery Method): nasal cannula  O2 Flow (L/min): 2  Height (cm): 165.1 (10-13 @ 16:40)  Weight (kg): 70.8 (10-13 @ 16:40)  BMI (kg/m2): 26 (10-13 @ 16:40)  BSA (m2): 1.78 (10-13 @ 16:40)  GEN: NAD  HEENT: normocephalic and atraumatic. EOMI. PERRL.    NECK: Supple.  No lymphadenopathy   LUNGS: bibasilar crackles   HEART: Regular rate and rhythm   ABDOMEN: Soft, nontender, and nondistended.    EXTREMITIES: Without edema.  NEUROLOGIC: grossly intact.  PSYCHIATRIC: Appropriate affect .  SKIN: No rash    Labs:  10-14    137  |  103  |  12  ----------------------------<  166[H]  3.7   |  26  |  0.58    Ca    9.0      14 Oct 2024 06:27  Phos  3.1     10-14  Mg     2.5     10-14    TPro  6.9  /  Alb  2.4[L]  /  TBili  0.5  /  DBili  x   /  AST  68[H]  /  ALT  97[H]  /  AlkPhos  276[H]  10-14                        12.1   15.86 )-----------( 449      ( 14 Oct 2024 06:27 )             35.1     PT/INR - ( 13 Oct 2024 17:25 )   PT: 13.3 sec;   INR: 1.14 ratio    PTT - ( 13 Oct 2024 17:25 )  PTT:35.4 sec  Urinalysis Basic - ( 14 Oct 2024 06:27 )    Color: x / Appearance: x / SG: x / pH: x  Gluc: 166 mg/dL / Ketone: x  / Bili: x / Urobili: x   Blood: x / Protein: x / Nitrite: x   Leuk Esterase: x / RBC: x / WBC x   Sq Epi: x / Non Sq Epi: x / Bacteria: x    LIVER FUNCTIONS - ( 14 Oct 2024 06:27 )  Alb: 2.4 g/dL / Pro: 6.9 g/dL / ALK PHOS: 276 U/L / ALT: 97 U/L / AST: 68 U/L / GGT: x           All imaging and other data have been reviewed.  < from: CT Angio Chest PE Protocol w/ IV Cont (10.13.24 @ 19:58) >  IMPRESSION:  Bibasilar alveolar opacities suspicious for pneumonia.  No evidence of pulmonary embolus.  1.6 cm aorticopulmonary window node, likely reactive.      Assessment and Plan:   65yo woman with PMH of HTN, Asthma, anxiety, migraine, GI bleed (2019), was admitted with feeling sick with a cold, cough and SOB for the past 4 days.   In ED she had fever and 101.9 and Chest CT showed opacities in btoh lungs suspicious for pneumonia.     # COVID 19   # Bacterial pneumonia, superimposed     - BMP, CBC w diff, NLR. Procalcitonin, Ferritin, CRP, LDH and D dimer for the start and periodically can be repeated.   - CT with bilateral opacities possibly bacterial pneumonia on top of viral  - Start Remdesivir 200mg stat and 100mg daily for 4 more days (total 5days)   - Follow Creat and LFTs daily while on Remdesivir.    - Start Dexamethasone 6mg po daily for 10days  - Watch O2 sat closely and taper as tolerated.   - Start ceftriaxone 1gm daily  - Start Azithromycin 500mg daily  - Send MRSA PCR and legionella U ag  - Anticoagulation as per protocol    Thank you for courtesy of this consult.     Will follow.   Discussed with primary team.    Melchor Jerome MD  Division of Infectious Diseases   Please call ID service at [phone] with any question.      75 minutes spent on total encounter assessing patient, examination, chart review, counseling and coordinating care by the attending physician/nurse/care manager.

## 2024-10-14 NOTE — PATIENT PROFILE ADULT - FALL HARM RISK - FALLEN IN PAST
Detail Level: Detailed
Patient Specific Counseling (Will Not Stick From Patient To Patient): Thoroughly discussed pathology report with patient; basal cell carcinoma. Recommended Mohs surgery with in-house Mohs surgeon. Also discuss details on Mohs procedure and expectations. Patient will be scheduled from [facility]. Patient understood and agreed with treatment plan.
No

## 2024-10-14 NOTE — CONSULT NOTE ADULT - ASSESSMENT
64-year-old female with a history of asthma, HTN, anxiety, migraine, GI bleed (2019), reporting that she has been sick with a cold and cough for the past 4 days. She had a fever at home last on Friday.    htn  asthma  eval for LRTI - PNA  anxiety  migraines  GI bleed hx    remdesivir - decadron for COVID with HYPOXEMIA -   emp ABX for poss LRTI - superimposed -   ct chest imaging reviewed -   ID eval noted  inhaler rx regimen for Asthma hx - pt uses Breo device at home and is requesting a prescription for NEBS on Discharge -   pt follows with Dr Devika Amador - Pulm Med - in the office - [facility] - [place]   I truman  monitor VS and HD and Sat  goal sat > 88 pct  CT neg for PE   nutrition - hydration  isol precs  dvt p

## 2024-10-14 NOTE — PROGRESS NOTE ADULT - PROBLEM SELECTOR PLAN 1
- acute hypoxic respiratory failure due to COVID pneumonia with suspected superimposed bacterial pneumonia  - CTA: Bibasilar alveolar opacities suspicious for pneumonia. No evidence of pulmonary embolus. 1.6 cm aorticopulmonary window node, likely reactive.  - F/u legionella Ag, strep pneumo Ag, MRSA/MSSA nares PCR  - started on Remdesivir  - c/w decadron due to hypoxia+COVID  - Continue ceftriaxone + azithromycin   - pantoprazole for GI ppx given pt on steroids  - Robitussin q6 prn for cough  - Symbicort 160  - Albuterol inhaler q6 prn  - Continuous pulse ox  - Taper O2 as tolerated  - Remote tele  - ID consulted, Dr. Jerome, recs appreciated  - pulm (Dr. Lynn), recs appreciated

## 2024-10-15 LAB
ALBUMIN SERPL ELPH-MCNC: 2.5 G/DL — LOW (ref 3.3–5)
ALP SERPL-CCNC: 269 U/L — HIGH (ref 40–120)
ALT FLD-CCNC: 82 U/L — HIGH (ref 12–78)
ANION GAP SERPL CALC-SCNC: 6 MMOL/L — SIGNIFICANT CHANGE UP (ref 5–17)
AST SERPL-CCNC: 40 U/L — HIGH (ref 15–37)
BASOPHILS # BLD AUTO: 0.07 K/UL — SIGNIFICANT CHANGE UP (ref 0–0.2)
BASOPHILS NFR BLD AUTO: 0.4 % — SIGNIFICANT CHANGE UP (ref 0–2)
BILIRUB SERPL-MCNC: 0.3 MG/DL — SIGNIFICANT CHANGE UP (ref 0.2–1.2)
BUN SERPL-MCNC: 17 MG/DL — SIGNIFICANT CHANGE UP (ref 7–23)
CALCIUM SERPL-MCNC: 9.3 MG/DL — SIGNIFICANT CHANGE UP (ref 8.5–10.1)
CHLORIDE SERPL-SCNC: 105 MMOL/L — SIGNIFICANT CHANGE UP (ref 96–108)
CO2 SERPL-SCNC: 28 MMOL/L — SIGNIFICANT CHANGE UP (ref 22–31)
CREAT SERPL-MCNC: 0.5 MG/DL — SIGNIFICANT CHANGE UP (ref 0.5–1.3)
EGFR: 103 ML/MIN/1.73M2 — SIGNIFICANT CHANGE UP
EOSINOPHIL # BLD AUTO: 0 K/UL — SIGNIFICANT CHANGE UP (ref 0–0.5)
EOSINOPHIL NFR BLD AUTO: 0 % — SIGNIFICANT CHANGE UP (ref 0–6)
GLUCOSE SERPL-MCNC: 161 MG/DL — HIGH (ref 70–99)
HCT VFR BLD CALC: 36 % — SIGNIFICANT CHANGE UP (ref 34.5–45)
HGB BLD-MCNC: 12.2 G/DL — SIGNIFICANT CHANGE UP (ref 11.5–15.5)
IMM GRANULOCYTES NFR BLD AUTO: 5 % — HIGH (ref 0–0.9)
LEGIONELLA AG UR QL: NEGATIVE — SIGNIFICANT CHANGE UP
LYMPHOCYTES # BLD AUTO: 1.8 K/UL — SIGNIFICANT CHANGE UP (ref 1–3.3)
LYMPHOCYTES # BLD AUTO: 9.6 % — LOW (ref 13–44)
MCHC RBC-ENTMCNC: 33.1 PG — SIGNIFICANT CHANGE UP (ref 27–34)
MCHC RBC-ENTMCNC: 33.9 GM/DL — SIGNIFICANT CHANGE UP (ref 32–36)
MCV RBC AUTO: 97.6 FL — SIGNIFICANT CHANGE UP (ref 80–100)
MONOCYTES # BLD AUTO: 0.88 K/UL — SIGNIFICANT CHANGE UP (ref 0–0.9)
MONOCYTES NFR BLD AUTO: 4.7 % — SIGNIFICANT CHANGE UP (ref 2–14)
MRSA PCR RESULT.: SIGNIFICANT CHANGE UP
NEUTROPHILS # BLD AUTO: 15.06 K/UL — HIGH (ref 1.8–7.4)
NEUTROPHILS NFR BLD AUTO: 80.3 % — HIGH (ref 43–77)
NRBC # BLD: 0 /100 WBCS — SIGNIFICANT CHANGE UP (ref 0–0)
PLATELET # BLD AUTO: 532 K/UL — HIGH (ref 150–400)
POTASSIUM SERPL-MCNC: 3.9 MMOL/L — SIGNIFICANT CHANGE UP (ref 3.5–5.3)
POTASSIUM SERPL-SCNC: 3.9 MMOL/L — SIGNIFICANT CHANGE UP (ref 3.5–5.3)
PROT SERPL-MCNC: 7 G/DL — SIGNIFICANT CHANGE UP (ref 6–8.3)
RBC # BLD: 3.69 M/UL — LOW (ref 3.8–5.2)
RBC # FLD: 13.2 % — SIGNIFICANT CHANGE UP (ref 10.3–14.5)
S AUREUS DNA NOSE QL NAA+PROBE: SIGNIFICANT CHANGE UP
S PNEUM AG UR QL: NEGATIVE — SIGNIFICANT CHANGE UP
SODIUM SERPL-SCNC: 139 MMOL/L — SIGNIFICANT CHANGE UP (ref 135–145)
T3 SERPL-MCNC: 77 NG/DL — LOW (ref 80–200)
T4 FREE SERPL-MCNC: 1.4 NG/DL — SIGNIFICANT CHANGE UP (ref 0.9–1.8)
URATE SERPL-MCNC: 3.3 MG/DL — SIGNIFICANT CHANGE UP (ref 2.5–7)
WBC # BLD: 18.75 K/UL — HIGH (ref 3.8–10.5)
WBC # FLD AUTO: 18.75 K/UL — HIGH (ref 3.8–10.5)

## 2024-10-15 PROCEDURE — 99233 SBSQ HOSP IP/OBS HIGH 50: CPT

## 2024-10-15 PROCEDURE — 99232 SBSQ HOSP IP/OBS MODERATE 35: CPT

## 2024-10-15 RX ADMIN — AZITHROMYCIN 255 MILLIGRAM(S): 250 TABLET, FILM COATED ORAL at 17:55

## 2024-10-15 RX ADMIN — ENOXAPARIN SODIUM 40 MILLIGRAM(S): 150 INJECTION SUBCUTANEOUS at 05:54

## 2024-10-15 RX ADMIN — Medication 100 MILLIGRAM(S): at 17:54

## 2024-10-15 RX ADMIN — Medication 1 DOSE(S): at 19:00

## 2024-10-15 RX ADMIN — Medication 40 MILLIGRAM(S): at 12:42

## 2024-10-15 RX ADMIN — PANTOPRAZOLE SODIUM 40 MILLIGRAM(S): 40 TABLET, DELAYED RELEASE ORAL at 05:55

## 2024-10-15 RX ADMIN — REMDESIVIR 200 MILLIGRAM(S): 5 INJECTION INTRAVENOUS at 12:44

## 2024-10-15 RX ADMIN — Medication 1 DOSE(S): at 07:00

## 2024-10-15 RX ADMIN — Medication 6 MILLIGRAM(S): at 05:55

## 2024-10-15 NOTE — PROGRESS NOTE ADULT - PROBLEM SELECTOR PLAN 1
- acute hypoxic respiratory failure due to COVID pneumonia with suspected superimposed bacterial pneumonia  - CTA: Bibasilar alveolar opacities suspicious for pneumonia. No evidence of pulmonary embolus. 1.6 cm aorticopulmonary window node, likely reactive.  - F/u legionella Ag, strep pneumo Ag, MRSA/MSSA nares PCR  - started on Remdesivir 10/14 -- ID may limit to 3-day course if pt continues to rapidly improve  - c/w decadron for now due to hypoxia+COVID on admission -- duration per ID/pulm  - Continue ceftriaxone + azithromycin   - pantoprazole for GI ppx given pt on steroids  - Robitussin q6 prn for cough  - c/w Advair  - c/w Albuterol PRN  - Continuous pulse ox  - hypoxia resolving -- tapered off [place] O2 this afternoon and saturating in low-90s on RA at rest   - check ambulatory RA O2 sats  - ID consulted, Dr. Jerome, recs appreciated  - pulm (Dr. Lynn), recs appreciated

## 2024-10-15 NOTE — PROGRESS NOTE ADULT - SUBJECTIVE AND OBJECTIVE BOX
Patient is a 64y old  Female who presents with a chief complaint of cough and shortness of breath.       INTERVAL HPI/OVERNIGHT EVENTS: Pt states cough and shortness of breath are improving and she is feeling "much better." NC O2 removed this afternoon and pt saturating in the low 90s on RA. Denies fever, chills, CP, abd pain, n/v, dysuria.     MEDICATIONS  (STANDING):  azithromycin  IVPB 500 milliGRAM(s) IV Intermittent every 24 hours  cefTRIAXone   IVPB 1000 milliGRAM(s) IV Intermittent every 24 hours  dexAMETHasone     Tablet 6 milliGRAM(s) Oral daily  DULoxetine 40 milliGRAM(s) Oral daily  enoxaparin Injectable 40 milliGRAM(s) SubCutaneous every 24 hours  fluticasone propionate/ salmeterol 250-50 MICROgram(s) Diskus 1 Dose(s) Inhalation two times a day  pantoprazole    Tablet 40 milliGRAM(s) Oral before breakfast  remdesivir  IVPB   IV Intermittent   remdesivir  IVPB 100 milliGRAM(s) IV Intermittent every 24 hours    MEDICATIONS  (PRN):  acetaminophen     Tablet .. 650 milliGRAM(s) Oral every 6 hours PRN Temp greater or equal to 38C (100.4F), Mild Pain (1 - 3)  albuterol    90 MICROgram(s) HFA Inhaler 2 Puff(s) Inhalation every 6 hours PRN Bronchospasm  guaifenesin/dextromethorphan Oral Liquid 15 milliLiter(s) Oral every 6 hours PRN Cough  melatonin 3 milliGRAM(s) Oral at bedtime PRN Insomnia        Allergies    penicillin (Hives; Swelling)  Allergic to melons (Rash)  latex (Rash)  adhesives (Rash)  penicillin (Hives)    Intolerances        REVIEW OF SYSTEMS:  CONSTITUTIONAL: +fatigue; No fever or chills  HEENT:  No headache, no sore throat  RESPIRATORY: cough and shortness of breath are improving  CARDIOVASCULAR: No chest pain, palpitations  GASTROINTESTINAL: No abd pain, nausea, vomiting, or diarrhea  GENITOURINARY: No dysuria, frequency, or hematuria  NEUROLOGICAL: no focal weakness or dizziness  MUSCULOSKELETAL: no myalgias     Vital Signs Last 24 Hrs  T(C): 36.6 (15 Oct 2024 12:40), Max: 36.7 (15 Oct 2024 05:03)  T(F): 97.8 (15 Oct 2024 12:40), Max: 98 (15 Oct 2024 05:03)  HR: 80 (15 Oct 2024 12:40) (80 - 90)  BP: 113/71 (15 Oct 2024 12:40) (107/62 - 115/71)  BP(mean): --  RR: 18 (15 Oct 2024 12:40) (18 - 21)  SpO2: 92% (15 Oct 2024 12:40) (91% - 95%)    Parameters below as of 15 Oct 2024 12:40  Patient On (Oxygen Delivery Method): room air      PHYSICAL EXAM:  GENERAL: NAD at rest on RA  HEENT:  anicteric, moist mucous membranes  CHEST/LUNG:  coarse breath sounds b/l in lower lung fields; upper lung fields CTA  HEART:  RRR, S1, S2  ABDOMEN:  BS+, soft, nontender, nondistended  EXTREMITIES: no edema, cyanosis, or calf tenderness  NERVOUS SYSTEM: answers questions and follows commands appropriately    LABS:                                     12.2   18.75 )-----------( 532      ( 15 Oct 2024 11:30 )             36.0     CBC Full  -  ( 15 Oct 2024 11:30 )  WBC Count : 18.75 K/uL  Hemoglobin : 12.2 g/dL  Hematocrit : 36.0 %  Platelet Count - Automated : 532 K/uL  Mean Cell Volume : 97.6 fl  Mean Cell Hemoglobin : 33.1 pg  Mean Cell Hemoglobin Concentration : 33.9 gm/dL  Auto Neutrophil # : 15.06 K/uL  Auto Lymphocyte # : 1.80 K/uL  Auto Monocyte # : 0.88 K/uL  Auto Eosinophil # : 0.00 K/uL  Auto Basophil # : 0.07 K/uL  Auto Neutrophil % : 80.3 %  Auto Lymphocyte % : 9.6 %  Auto Monocyte % : 4.7 %  Auto Eosinophil % : 0.0 %  Auto Basophil % : 0.4 %    10-15    139  |  105  |  17  ----------------------------<  161[H]  3.9   |  28  |  0.50    Ca    9.3      15 Oct 2024 11:30    TPro  7.0  /  Alb  2.5[L]  /  TBili  0.3  /  DBili  x   /  AST  40[H]  /  ALT  82[H]  /  AlkPhos  269[H]  10-15        CAPILLARY BLOOD GLUCOSE      RADIOLOGY & ADDITIONAL TESTS:    Personally reviewed.     Consultant(s) Notes Reviewed:  [x] YES  [ ] NO     Patient is a 64y old  Female who presents with a chief complaint of cough and shortness of breath.        INTERVAL HPI/OVERNIGHT EVENTS: Pt states cough and shortness of breath are improving and she is feeling "much better." NC O2 removed this afternoon and pt saturating in the low 90s on RA. Denies fever, chills, CP, abd pain, n/v, dysuria.     MEDICATIONS  (STANDING):  azithromycin  IVPB 500 milliGRAM(s) IV Intermittent every 24 hours  cefTRIAXone   IVPB 1000 milliGRAM(s) IV Intermittent every 24 hours  dexAMETHasone     Tablet 6 milliGRAM(s) Oral daily  DULoxetine 40 milliGRAM(s) Oral daily  enoxaparin Injectable 40 milliGRAM(s) SubCutaneous every 24 hours  fluticasone propionate/ salmeterol 250-50 MICROgram(s) Diskus 1 Dose(s) Inhalation two times a day  pantoprazole    Tablet 40 milliGRAM(s) Oral before breakfast  remdesivir  IVPB   IV Intermittent   remdesivir  IVPB 100 milliGRAM(s) IV Intermittent every 24 hours    MEDICATIONS  (PRN):  acetaminophen     Tablet .. 650 milliGRAM(s) Oral every 6 hours PRN Temp greater or equal to 38C (100.4F), Mild Pain (1 - 3)  albuterol    90 MICROgram(s) HFA Inhaler 2 Puff(s) Inhalation every 6 hours PRN Bronchospasm  guaifenesin/dextromethorphan Oral Liquid 15 milliLiter(s) Oral every 6 hours PRN Cough  melatonin 3 milliGRAM(s) Oral at bedtime PRN Insomnia        Allergies    penicillin (Hives; Swelling)  Allergic to melons (Rash)  latex (Rash)  adhesives (Rash)  penicillin (Hives)    Intolerances        REVIEW OF SYSTEMS:  CONSTITUTIONAL: +fatigue; No fever or chills  HEENT:  No headache, no sore throat  RESPIRATORY: cough and shortness of breath are improving  CARDIOVASCULAR: No chest pain, palpitations  GASTROINTESTINAL: No abd pain, nausea, vomiting, or diarrhea  GENITOURINARY: No dysuria, frequency, or hematuria  NEUROLOGICAL: no focal weakness or dizziness  MUSCULOSKELETAL: no myalgias     Vital Signs Last 24 Hrs  T(C): 36.6 (15 Oct 2024 12:40), Max: 36.7 (15 Oct 2024 05:03)  T(F): 97.8 (15 Oct 2024 12:40), Max: 98 (15 Oct 2024 05:03)  HR: 80 (15 Oct 2024 12:40) (80 - 90)  BP: 113/71 (15 Oct 2024 12:40) (107/62 - 115/71)  BP(mean): --  RR: 18 (15 Oct 2024 12:40) (18 - 21)  SpO2: 92% (15 Oct 2024 12:40) (91% - 95%)    Parameters below as of 15 Oct 2024 12:40  Patient On (Oxygen Delivery Method): room air      PHYSICAL EXAM:  GENERAL: NAD at rest on RA  HEENT:  anicteric, moist mucous membranes  CHEST/LUNG:  coarse breath sounds b/l in lower lung fields; upper lung fields CTA  HEART:  RRR, S1, S2  ABDOMEN:  BS+, soft, nontender, nondistended  EXTREMITIES: no edema, cyanosis, or calf tenderness  NERVOUS SYSTEM: answers questions and follows commands appropriately    LABS:                                     12.2   18.75 )-----------( 532      ( 15 Oct 2024 11:30 )             36.0     CBC Full  -  ( 15 Oct 2024 11:30 )  WBC Count : 18.75 K/uL  Hemoglobin : 12.2 g/dL  Hematocrit : 36.0 %  Platelet Count - Automated : 532 K/uL  Mean Cell Volume : 97.6 fl  Mean Cell Hemoglobin : 33.1 pg  Mean Cell Hemoglobin Concentration : 33.9 gm/dL  Auto Neutrophil # : 15.06 K/uL  Auto Lymphocyte # : 1.80 K/uL  Auto Monocyte # : 0.88 K/uL  Auto Eosinophil # : 0.00 K/uL  Auto Basophil # : 0.07 K/uL  Auto Neutrophil % : 80.3 %  Auto Lymphocyte % : 9.6 %  Auto Monocyte % : 4.7 %  Auto Eosinophil % : 0.0 %  Auto Basophil % : 0.4 %    10-15    139  |  105  |  17  ----------------------------<  161[H]  3.9   |  28  |  0.50    Ca    9.3      15 Oct 2024 11:30    TPro  7.0  /  Alb  2.5[L]  /  TBili  0.3  /  DBili  x   /  AST  40[H]  /  ALT  82[H]  /  AlkPhos  269[H]  10-15        CAPILLARY BLOOD GLUCOSE      RADIOLOGY & ADDITIONAL TESTS:    Personally reviewed.     Consultant(s) Notes Reviewed:  [x] YES  [ ] NO

## 2024-10-15 NOTE — CASE MANAGEMENT PROGRESS NOTE - NSCMPROGRESSNOTE_GEN_ALL_CORE
Patient discussed during interdisciplinary rounds. STAR PNA/covid status noted. Patient remains acute on IV remdesivir, IV zithromax, and IV rocephin. Patient remains on 2L oxygen via nasal cannula with a plan to wean if medically safe. Referral initiated for [facility] and will be sent prior to discharge. CM will remain available for all discharge needs.

## 2024-10-15 NOTE — PROGRESS NOTE ADULT - SUBJECTIVE AND OBJECTIVE BOX
Date/Time Patient Seen:  		  Referring MD:   Data Reviewed	       Patient is a 64y old  Female who presents with a chief complaint of COVID PNA with possible superimposed bacterial pneumonia. (14 Oct 2024 16:19)      Subjective/HPI     PAST MEDICAL & SURGICAL HISTORY:  Asthma    Migraine    HTN (hypertension)    GI bleed    HTN (hypertension)    Anxiety    Migraine    History of appendectomy    S/P appendectomy          Medication list         MEDICATIONS  (STANDING):  azithromycin  IVPB 500 milliGRAM(s) IV Intermittent every 24 hours  cefTRIAXone   IVPB 1000 milliGRAM(s) IV Intermittent every 24 hours  dexAMETHasone     Tablet 6 milliGRAM(s) Oral daily  DULoxetine 40 milliGRAM(s) Oral daily  enoxaparin Injectable 40 milliGRAM(s) SubCutaneous every 24 hours  fluticasone propionate/ salmeterol 250-50 MICROgram(s) Diskus 1 Dose(s) Inhalation two times a day  pantoprazole    Tablet 40 milliGRAM(s) Oral before breakfast  remdesivir  IVPB 100 milliGRAM(s) IV Intermittent every 24 hours  remdesivir  IVPB   IV Intermittent     MEDICATIONS  (PRN):  acetaminophen     Tablet .. 650 milliGRAM(s) Oral every 6 hours PRN Temp greater or equal to 38C (100.4F), Mild Pain (1 - 3)  albuterol    90 MICROgram(s) HFA Inhaler 2 Puff(s) Inhalation every 6 hours PRN Bronchospasm  guaifenesin/dextromethorphan Oral Liquid 15 milliLiter(s) Oral every 6 hours PRN Cough  melatonin 3 milliGRAM(s) Oral at bedtime PRN Insomnia         Vitals log        ICU Vital Signs Last 24 Hrs  T(C): 36.7 (15 Oct 2024 05:03), Max: 36.7 (15 Oct 2024 05:03)  T(F): 98 (15 Oct 2024 05:03), Max: 98 (15 Oct 2024 05:03)  HR: 82 (15 Oct 2024 05:03) (82 - 90)  BP: 115/71 (15 Oct 2024 05:03) (105/60 - 115/71)  BP(mean): --  ABP: --  ABP(mean): --  RR: 18 (15 Oct 2024 05:03) (18 - 21)  SpO2: 95% (15 Oct 2024 05:03) (91% - 95%)    O2 Parameters below as of 15 Oct 2024 05:03  Patient On (Oxygen Delivery Method): nasal cannula  O2 Flow (L/min): 2               Input and Output:  I&O's Detail      Lab Data                        12.1   15.86 )-----------( 449      ( 14 Oct 2024 06:27 )             35.1     10-14    137  |  103  |  12  ----------------------------<  166[H]  3.7   |  26  |  0.58    Ca    9.0      14 Oct 2024 06:27  Phos  3.1     10-14  Mg     2.5     10-14    TPro  6.9  /  Alb  2.4[L]  /  TBili  0.5  /  DBili  x   /  AST  68[H]  /  ALT  97[H]  /  AlkPhos  276[H]  10-14            Review of Systems	      Objective     Physical Examination    heart s1s2  lung dec BS  head nc  head at      Pertinent Lab findings & Imaging      Stew:  NO   Adequate UO     I&O's Detail           Discussed with:     Cultures:	        Radiology

## 2024-10-15 NOTE — PROGRESS NOTE ADULT - SUBJECTIVE AND OBJECTIVE BOX
[facility]  INFECTIOUS DISEASES   [address] [place] [address]  Tel: [phone]     Fax: [phone]  ========================================================  MD Luis Barnett Michelle, MD Shah, Kaushal, MD Sunjit, Jaspal, MD Sehrish Shahid, MD   ========================================================    [id number]  MARIA A JIMENES     Follow up: Pneumonia     Feels better, no fever, no new complaint.     PAST MEDICAL & SURGICAL HISTORY:  Asthma  HTN (hypertension)  GI bleed  Anxiety  Migraine  History of appendectomy  S/P appendectomy    Social Hx: No current smoking, EtOH or drugs     FAMILY HISTORY:  Noncontributory     Allergies  penicillin (Hives; Swelling)  Allergic to melons (Rash)  latex (Rash)  adhesives (Rash)  penicillin (Hives)    MEDICATIONS  (STANDING):  azithromycin  IVPB 500 milliGRAM(s) IV Intermittent every 24 hours  cefTRIAXone   IVPB 1000 milliGRAM(s) IV Intermittent every 24 hours  dexAMETHasone     Tablet 6 milliGRAM(s) Oral daily  DULoxetine 40 milliGRAM(s) Oral daily  enoxaparin Injectable 40 milliGRAM(s) SubCutaneous every 24 hours  fluticasone propionate/ salmeterol 250-50 MICROgram(s) Diskus 1 Dose(s) Inhalation two times a day  pantoprazole    Tablet 40 milliGRAM(s) Oral before breakfast  remdesivir  IVPB   IV Intermittent     MEDICATIONS  (PRN):  acetaminophen     Tablet .. 650 milliGRAM(s) Oral every 6 hours PRN Temp greater or equal to 38C (100.4F), Mild Pain (1 - 3)  albuterol    90 MICROgram(s) HFA Inhaler 2 Puff(s) Inhalation every 6 hours PRN Bronchospasm  guaifenesin/dextromethorphan Oral Liquid 15 milliLiter(s) Oral every 6 hours PRN Cough  melatonin 3 milliGRAM(s) Oral at bedtime PRN Insomnia     REVIEW OF SYSTEMS:  CONSTITUTIONAL:  No Fever or chills  HEENT:  No diplopia or blurred vision.  No sore throat or runny nose.  CARDIOVASCULAR:  No chest pain   RESPIRATORY:  +cough, +shortness of breath  GASTROINTESTINAL:  No nausea, vomiting or diarrhea.  GENITOURINARY:  No dysuria, frequency or urgency. No Blood in urine  MUSCULOSKELETAL:  no joint aches, no muscle pain  SKIN:  No change in skin, hair or nails.    Physical Exam:  Vital Signs Last 24 Hrs  T(C): 36.6 (15 Oct 2024 12:40), Max: 36.7 (15 Oct 2024 05:03)  T(F): 97.8 (15 Oct 2024 12:40), Max: 98 (15 Oct 2024 05:03)  HR: 80 (15 Oct 2024 12:40) (80 - 90)  BP: 113/71 (15 Oct 2024 12:40) (107/62 - 115/71)  BP(mean): --  RR: 18 (15 Oct 2024 12:40) (18 - 21)  SpO2: 92% (15 Oct 2024 12:40) (91% - 95%)  Parameters below as of 15 Oct 2024 12:40  Patient On (Oxygen Delivery Method): room air  GEN: NAD  HEENT: normocephalic and atraumatic. EOMI. PERRL.    NECK: Supple.  No lymphadenopathy   LUNGS: bibasilar crackles   HEART: Regular rate and rhythm   ABDOMEN: Soft, nontender, and nondistended.    EXTREMITIES: Without edema.  NEUROLOGIC: grossly intact.  PSYCHIATRIC: Appropriate affect .  SKIN: No rash    Labs:                        12.2   18.75 )-----------( 532      ( 15 Oct 2024 11:30 )             36.0     10-15    139  |  105  |  17  ----------------------------<  161[H]  3.9   |  28  |  0.50    Ca    9.3      15 Oct 2024 11:30  Phos  3.1     10-14  Mg     2.5     10-14    TPro  7.0  /  Alb  2.5[L]  /  TBili  0.3  /  DBili  x   /  AST  40[H]  /  ALT  82[H]  /  AlkPhos  269[H]  10-15    Culture - Blood (collected 10-13-24 @ 17:30)  Source: .Blood BLOOD  Preliminary Report (10-14-24 @ 22:02):    No growth at 24 hours    Culture - Blood (collected 10-13-24 @ 17:25)  Source: .Blood BLOOD  Preliminary Report (10-14-24 @ 22:02):    No growth at 24 hours    WBC Count: 18.75 K/uL (10-15-24 @ 11:30)  WBC Count: 15.86 K/uL (10-14-24 @ 06:27)  WBC Count: 18.15 K/uL (10-13-24 @ 17:25)    Creatinine: 0.50 mg/dL (10-15-24 @ 11:30)  Creatinine: 0.58 mg/dL (10-14-24 @ 06:27)  Creatinine: 0.79 mg/dL (10-13-24 @ 17:25)    Procalcitonin: 0.32 ng/mL (10-13-24 @ 17:25)     SARS-CoV-2 Result: Detected (10-13-24 @ 17:25)       All imaging and other data have been reviewed.  < from: CT Angio Chest PE Protocol w/ IV Cont (10.13.24 @ 19:58) >  IMPRESSION:  Bibasilar alveolar opacities suspicious for pneumonia.  No evidence of pulmonary embolus.  1.6 cm aorticopulmonary window node, likely reactive.      Assessment and Plan:   63yo woman with PMH of HTN, Asthma, anxiety, migraine, GI bleed (2019), was admitted with feeling sick with a cold, cough and SOB for the past 4 days.   In ED she had fever and 101.9 and Chest CT showed opacities in btoh lungs suspicious for pneumonia.     # COVID 19   # Bacterial pneumonia, superimposed     - Blood cultures NGTD will follow   - CT with bilateral opacities possibly bacterial pneumonia on top of viral  - Continue Remdesivir if remains stable can stop after 3 days   - Follow Creat and LFTs daily while on Remdesivir.    - Conitnue Dexamethasone until discharge   - Watch O2 sat closely and currently in RA   - Continue ceftriaxone 1gm daily  - Continue Azithromycin 500mg daily  - MRSA PCR neg and legionella U ag pending  - Anticoagulation as per protocol    Will follow.     Melchor Jerome MD  Division of Infectious Diseases   Please call ID service at [phone] with any question.      50 minutes spent on total encounter assessing patient, examination, chart review, counseling and coordinating care by the attending physician/nurse/care manager.

## 2024-10-16 ENCOUNTER — TRANSCRIPTION ENCOUNTER (OUTPATIENT)
Age: 64
End: 2024-10-16

## 2024-10-16 VITALS
RESPIRATION RATE: 18 BRPM | SYSTOLIC BLOOD PRESSURE: 127 MMHG | TEMPERATURE: 98 F | HEART RATE: 78 BPM | OXYGEN SATURATION: 90 % | DIASTOLIC BLOOD PRESSURE: 72 MMHG

## 2024-10-16 LAB
ALBUMIN SERPL ELPH-MCNC: 2.4 G/DL — LOW (ref 3.3–5)
ALP SERPL-CCNC: 204 U/L — HIGH (ref 40–120)
ALT FLD-CCNC: 64 U/L — SIGNIFICANT CHANGE UP (ref 12–78)
ANION GAP SERPL CALC-SCNC: 7 MMOL/L — SIGNIFICANT CHANGE UP (ref 5–17)
AST SERPL-CCNC: 28 U/L — SIGNIFICANT CHANGE UP (ref 15–37)
BASOPHILS # BLD AUTO: 0.08 K/UL — SIGNIFICANT CHANGE UP (ref 0–0.2)
BASOPHILS NFR BLD AUTO: 0.5 % — SIGNIFICANT CHANGE UP (ref 0–2)
BILIRUB SERPL-MCNC: 0.3 MG/DL — SIGNIFICANT CHANGE UP (ref 0.2–1.2)
BUN SERPL-MCNC: 18 MG/DL — SIGNIFICANT CHANGE UP (ref 7–23)
CALCIUM SERPL-MCNC: 8.6 MG/DL — SIGNIFICANT CHANGE UP (ref 8.5–10.1)
CHLORIDE SERPL-SCNC: 105 MMOL/L — SIGNIFICANT CHANGE UP (ref 96–108)
CO2 SERPL-SCNC: 28 MMOL/L — SIGNIFICANT CHANGE UP (ref 22–31)
CREAT SERPL-MCNC: 0.6 MG/DL — SIGNIFICANT CHANGE UP (ref 0.5–1.3)
EGFR: 99 ML/MIN/1.73M2 — SIGNIFICANT CHANGE UP
EOSINOPHIL # BLD AUTO: 0 K/UL — SIGNIFICANT CHANGE UP (ref 0–0.5)
EOSINOPHIL NFR BLD AUTO: 0 % — SIGNIFICANT CHANGE UP (ref 0–6)
GLUCOSE SERPL-MCNC: 139 MG/DL — HIGH (ref 70–99)
HCT VFR BLD CALC: 35.9 % — SIGNIFICANT CHANGE UP (ref 34.5–45)
HGB BLD-MCNC: 12 G/DL — SIGNIFICANT CHANGE UP (ref 11.5–15.5)
IMM GRANULOCYTES NFR BLD AUTO: 6.1 % — HIGH (ref 0–0.9)
LYMPHOCYTES # BLD AUTO: 17 % — SIGNIFICANT CHANGE UP (ref 13–44)
LYMPHOCYTES # BLD AUTO: 2.54 K/UL — SIGNIFICANT CHANGE UP (ref 1–3.3)
MAGNESIUM SERPL-MCNC: 2.5 MG/DL — SIGNIFICANT CHANGE UP (ref 1.6–2.6)
MCHC RBC-ENTMCNC: 32.8 PG — SIGNIFICANT CHANGE UP (ref 27–34)
MCHC RBC-ENTMCNC: 33.4 GM/DL — SIGNIFICANT CHANGE UP (ref 32–36)
MCV RBC AUTO: 98.1 FL — SIGNIFICANT CHANGE UP (ref 80–100)
MONOCYTES # BLD AUTO: 1.19 K/UL — HIGH (ref 0–0.9)
MONOCYTES NFR BLD AUTO: 8 % — SIGNIFICANT CHANGE UP (ref 2–14)
NEUTROPHILS # BLD AUTO: 10.19 K/UL — HIGH (ref 1.8–7.4)
NEUTROPHILS NFR BLD AUTO: 68.4 % — SIGNIFICANT CHANGE UP (ref 43–77)
NRBC # BLD: 0 /100 WBCS — SIGNIFICANT CHANGE UP (ref 0–0)
PHOSPHATE SERPL-MCNC: 3.3 MG/DL — SIGNIFICANT CHANGE UP (ref 2.5–4.5)
PLATELET # BLD AUTO: 547 K/UL — HIGH (ref 150–400)
POTASSIUM SERPL-MCNC: 4.3 MMOL/L — SIGNIFICANT CHANGE UP (ref 3.5–5.3)
POTASSIUM SERPL-SCNC: 4.3 MMOL/L — SIGNIFICANT CHANGE UP (ref 3.5–5.3)
PROT SERPL-MCNC: 6.3 G/DL — SIGNIFICANT CHANGE UP (ref 6–8.3)
RBC # BLD: 3.66 M/UL — LOW (ref 3.8–5.2)
RBC # FLD: 13.4 % — SIGNIFICANT CHANGE UP (ref 10.3–14.5)
SODIUM SERPL-SCNC: 140 MMOL/L — SIGNIFICANT CHANGE UP (ref 135–145)
WBC # BLD: 14.91 K/UL — HIGH (ref 3.8–10.5)
WBC # FLD AUTO: 14.91 K/UL — HIGH (ref 3.8–10.5)

## 2024-10-16 PROCEDURE — 99232 SBSQ HOSP IP/OBS MODERATE 35: CPT

## 2024-10-16 PROCEDURE — 99239 HOSP IP/OBS DSCHRG MGMT >30: CPT

## 2024-10-16 RX ORDER — FLUTICASONE PROPION/SALMETEROL 100-50 MCG
1 BLISTER, WITH INHALATION DEVICE INHALATION
Qty: 2 | Refills: 0
Start: 2024-10-16 | End: 2024-11-14

## 2024-10-16 RX ADMIN — ENOXAPARIN SODIUM 40 MILLIGRAM(S): 150 INJECTION SUBCUTANEOUS at 05:24

## 2024-10-16 RX ADMIN — Medication 1 DOSE(S): at 05:23

## 2024-10-16 RX ADMIN — PANTOPRAZOLE SODIUM 40 MILLIGRAM(S): 40 TABLET, DELAYED RELEASE ORAL at 05:24

## 2024-10-16 RX ADMIN — Medication 40 MILLIGRAM(S): at 11:53

## 2024-10-16 RX ADMIN — Medication 100 MILLIGRAM(S): at 13:39

## 2024-10-16 RX ADMIN — Medication 6 MILLIGRAM(S): at 05:23

## 2024-10-16 RX ADMIN — REMDESIVIR 200 MILLIGRAM(S): 5 INJECTION INTRAVENOUS at 11:52

## 2024-10-16 NOTE — DISCHARGE NOTE PROVIDER - HOSPITAL COURSE
HPI:  Patient is a 64-year-old female with a history of asthma, HTN, anxiety, migraine, GI bleed (2019), reporting that she has been sick with a cold and cough for the past 4 days. She had a fever at home last on Friday.  Taking Tylenol for the symptoms.  Her  was sick a week before with a URI but tested negative at home for COVID.  Today she was feeling more lethargic and weak with nausea.  So she went to urgent care and was directed to come to the emergency room for evaluation. Denies current fevers, chills, cough, CP, SOB, abd pain, n/v/d.    ED Course:   Vitals: BP: 107/76, HR: 122, Temp: 97.9 tmax 101.9, RR: 22, SpO2: 95% on nc   Labs: wbc 18.15, plt 467, d dimer 241, na 133, k 3, cl 92, alb 2.7, alk phos 304, ast 94, alt 108, lactate wnl, COVID +   CTA: IMPRESSION:  Bibasilar alveolar opacities suspicious for pneumonia.  No evidence of pulmonary embolus.  1.6 cm aorticopulmonary window node, likely reactive.  EKG: sinus tach  Received in the ED: ofirmev 1g, ceftriaxone 1g, azithro 500mg, solumedrol 125mg, potassium chloride 40meq po, NS bolus 2L, duoneb x 1   (13 Oct 2024 21:06)      ---  HOSPITAL COURSE: Patient admitted to medicine floor for management of superimposed bacteria pneumonia.    Patient was admitted for bacteria pneumonia superimposed on COVID pneumonia. Upon admission, patient was found to be hypoxic and met sepsis criteria. Blood cultures were collected. Patient was started on remdesevir, and rocephin and azithromycin were started. Steroids were initiated to assist with the hypoxia. ID was consulted for comanagement. MRSA and legionella were negative and patient was taken off of azithromycin. Blood cultures were negative after 48 hours. Patient was found to have mild bump in liver enzymes so an abd US was completed which was unremarkable. Patient completed a total of 3 days of remdesevir and rocephin was transitioned to PO ****** for discharge.     Pt seen and examined on day of discharge. Patient is medically optimized for discharge to home with close outpatient followup.    PHYSICAL EXAM ON DAY OF DISCHARGE:  The patient was seen and examined on the day of discharge. Please see progress note from day of discharge for further information.         ---  CONSULTANTS:     ---  TIME SPENT:  I, the attending physician, was physically present for the key portions of the evaluation and management (E/M) service provided. The total amount of time spent reviewing the hospital notes, laboratory values, imaging findings, assessing/counseling the patient, discussing with consultant physicians, social work, nursing staff was -- minutes    ---  Primary care provider was made aware of plan for discharge:      [  ] NO     [  ] YES   HPI:  Patient is a 64-year-old female with a history of asthma, HTN, anxiety, migraine, GI bleed (2019), reporting that she has been sick with a cold and cough for the past 4 days. She had a fever at home last on Friday.  Taking Tylenol for the symptoms.  Her  was sick a week before with a URI but tested negative at home for COVID.  Today she was feeling more lethargic and weak with nausea.  So she went to urgent care and was directed to come to the emergency room for evaluation. Denies current fevers, chills, cough, CP, SOB, abd pain, n/v/d.    ED Course:   Vitals: BP: 107/76, HR: 122, Temp: 97.9 tmax 101.9, RR: 22, SpO2: 95% on nc   Labs: wbc 18.15, plt 467, d dimer 241, na 133, k 3, cl 92, alb 2.7, alk phos 304, ast 94, alt 108, lactate wnl, COVID +   CTA: IMPRESSION:  Bibasilar alveolar opacities suspicious for pneumonia.  No evidence of pulmonary embolus.  1.6 cm aorticopulmonary window node, likely reactive.  EKG: sinus tach  Received in the ED: ofirmev 1g, ceftriaxone 1g, azithro 500mg, solumedrol 125mg, potassium chloride 40meq po, NS bolus 2L, duoneb x 1   (13 Oct 2024 21:06)      ---  HOSPITAL COURSE: Patient admitted to medicine floor for management of superimposed bacteria pneumonia.    Patient was admitted for bacteria pneumonia superimposed on COVID pneumonia. Upon admission, patient was found to be hypoxic and met sepsis criteria. Blood cultures were collected. Patient was started on remdesevir, and rocephin and azithromycin were started. Steroids were initiated to assist with the hypoxia. ID was consulted for comanagement. MRSA and legionella were negative and patient was taken off of azithromycin. Blood cultures were negative after 48 hours. Patient was found to have mild bump in liver enzymes so an abd US was completed which was unremarkable. Patient completed a total of 3 days of remdesevir and rocephin was transitioned to PO ****** for discharge.     Pt seen and examined on day of discharge. Patient is medically optimized for discharge to home with close outpatient followup.    PHYSICAL EXAM ON DAY OF DISCHARGE:  The patient was seen and examined on the day of discharge. Please see progress note from day of discharge for further information.      HPI:  Patient is a 64-year-old female with a history of asthma, HTN, anxiety, migraine, GI bleed (2019), reporting that she has been sick with a cold and cough for the past 4 days. She had a fever at home last on Friday.  Taking Tylenol for the symptoms.  Her  was sick a week before with a URI but tested negative at home for COVID.  Today she was feeling more lethargic and weak with nausea.  So she went to urgent care and was directed to come to the emergency room for evaluation. Denies current fevers, chills, cough, CP, SOB, abd pain, n/v/d.    ED Course:   Vitals: BP: 107/76, HR: 122, Temp: 97.9 tmax 101.9, RR: 22, SpO2: 95% on nc   Labs: wbc 18.15, plt 467, d dimer 241, na 133, k 3, cl 92, alb 2.7, alk phos 304, ast 94, alt 108, lactate wnl, COVID +   CTA: IMPRESSION:  Bibasilar alveolar opacities suspicious for pneumonia.  No evidence of pulmonary embolus.  1.6 cm aorticopulmonary window node, likely reactive.  EKG: sinus tach  Received in the ED: ofirmev 1g, ceftriaxone 1g, azithro 500mg, solumedrol 125mg, potassium chloride 40meq po, NS bolus 2L, duoneb x 1   (13 Oct 2024 21:06)      ---  HOSPITAL COURSE: Patient admitted to medicine floor for management of superimposed bacteria pneumonia.    Patient was admitted for bacteria pneumonia superimposed on COVID pneumonia. Upon admission, patient was found to be hypoxic and met sepsis criteria. Blood cultures were collected. Patient was started on remdesevir, and rocephin and azithromycin were started. Steroids were initiated to assist with the hypoxia. ID was consulted for comanagement. MRSA and legionella were negative and patient was taken off of azithromycin. Blood cultures were negative after 48 hours. Patient was found to have mild bump in liver enzymes so an abd US was completed which was unremarkable. Patient completed a total of 3 days of remdesevir and rocephin was transitioned to PO Levaquin 750mg daily for discharge.     Pt seen and examined on day of discharge. Patient is medically optimized for discharge to home with close outpatient followup.    PHYSICAL EXAM ON DAY OF DISCHARGE:  The patient was seen and examined on the day of discharge. Please see progress note from day of discharge for further information.

## 2024-10-16 NOTE — DISCHARGE NOTE NURSING/CASE MANAGEMENT/SOCIAL WORK - FINANCIAL ASSISTANCE
[facility] provides services at a reduced cost to those who are determined to be eligible through [facility]’s financial assistance program. Information regarding [facility]’s financial assistance program can be found by going to [email or web address] or by calling [phone].

## 2024-10-16 NOTE — DISCHARGE NOTE PROVIDER - NSDCMRMEDTOKEN_GEN_ALL_CORE_FT
Albuterol (Eqv-Proventil HFA) 90 mcg/inh inhalation aerosol: 2 puff(s) inhaled once a day as needed for  shortness of breath and/or wheezing  amLODIPine 5 mg oral tablet: 1 tab(s) orally once a day  DULoxetine 40 mg oral delayed release capsule: 1 cap(s) orally once a day   Albuterol (Eqv-Proventil HFA) 90 mcg/inh inhalation aerosol: 2 puff(s) inhaled once a day as needed for  shortness of breath and/or wheezing  amLODIPine 5 mg oral tablet: 1 tab(s) orally once a day  DULoxetine 40 mg oral delayed release capsule: 1 cap(s) orally once a day  fluticasone-salmeterol 250 mcg-50 mcg inhalation powder: 1 inhaled 2 times a day  levoFLOXacin 750 mg oral tablet: 1 tab(s) orally once a day

## 2024-10-16 NOTE — PROGRESS NOTE ADULT - ATTENDING COMMENTS
63yo F w/ PMH of asthma, HTN, anxiety, migraine, GI bleed (2019) presenting with SOB and cough a/w acute hypoxic respiratory failure due to COVID PNA and suspected superimposed bacterial pneumonia. Discussed with ID stable for dc home with oral abx x 4 more days. Patient in agreement. Completed 3 day course of Remdesivir. Spo2 appropriate on RA.

## 2024-10-16 NOTE — DISCHARGE NOTE PROVIDER - CARE PROVIDER_API CALL
Devika Amador  Pulmonary Disease  [address] [place] [address]  Phone: [phone]  Fax: [phone]  Follow Up Time:

## 2024-10-16 NOTE — PROGRESS NOTE ADULT - TIME BILLING
Pt seen + examined on 10/15. Note written by attending, see above.  Time spent: 51min. Time spent discussing/coordinating care with consultants, CM/SW team, and counseling the patient on medical condition -- COVID, hypoxia, suspected superimposed bacterial PNA, Remdesivir, decadron, IV abx.
Pt seen + examined on 10/14. Note written by attending, see above.  Time spent: 51min. Time spent discussing/coordinating care with consultants, CM/SW team, and counseling the patient on medical condition -- COVID, hypoxia, suspected superimposed bacterial PNA, Remdesivir, decadron, IV abx.
Reviewing chart notes and data, reviewing telemetry monitor records, face to face time counseling the patient, coordinating care with SW/CM at [facility].   Pt seen and examined. Case discussed with resident. Agree with assessment and plan above (edited by me in detail)

## 2024-10-16 NOTE — PROGRESS NOTE ADULT - SUBJECTIVE AND OBJECTIVE BOX
Date/Time Patient Seen:  		  Referring MD:   Data Reviewed	       Patient is a 64y old  Female who presents with a chief complaint of COVID PNA with possible superimposed bacterial pneumonia. (15 Oct 2024 13:32)      Subjective/HPI     PAST MEDICAL & SURGICAL HISTORY:  Asthma    Migraine    HTN (hypertension)    GI bleed    HTN (hypertension)    Anxiety    Migraine    History of appendectomy    S/P appendectomy          Medication list         MEDICATIONS  (STANDING):  azithromycin  IVPB 500 milliGRAM(s) IV Intermittent every 24 hours  cefTRIAXone   IVPB 1000 milliGRAM(s) IV Intermittent every 24 hours  dexAMETHasone     Tablet 6 milliGRAM(s) Oral daily  DULoxetine 40 milliGRAM(s) Oral daily  enoxaparin Injectable 40 milliGRAM(s) SubCutaneous every 24 hours  fluticasone propionate/ salmeterol 250-50 MICROgram(s) Diskus 1 Dose(s) Inhalation two times a day  pantoprazole    Tablet 40 milliGRAM(s) Oral before breakfast  remdesivir  IVPB 100 milliGRAM(s) IV Intermittent every 24 hours  remdesivir  IVPB   IV Intermittent     MEDICATIONS  (PRN):  acetaminophen     Tablet .. 650 milliGRAM(s) Oral every 6 hours PRN Temp greater or equal to 38C (100.4F), Mild Pain (1 - 3)  albuterol    90 MICROgram(s) HFA Inhaler 2 Puff(s) Inhalation every 6 hours PRN Bronchospasm  guaifenesin/dextromethorphan Oral Liquid 15 milliLiter(s) Oral every 6 hours PRN Cough  melatonin 3 milliGRAM(s) Oral at bedtime PRN Insomnia         Vitals log        ICU Vital Signs Last 24 Hrs  T(C): 36.7 (15 Oct 2024 20:28), Max: 36.7 (15 Oct 2024 20:28)  T(F): 98 (15 Oct 2024 20:28), Max: 98 (15 Oct 2024 20:28)  HR: 75 (15 Oct 2024 20:28) (75 - 80)  BP: 118/70 (15 Oct 2024 20:28) (113/71 - 118/70)  BP(mean): --  ABP: --  ABP(mean): --  RR: 18 (15 Oct 2024 20:28) (18 - 18)  SpO2: 90% (15 Oct 2024 20:28) (90% - 92%)    O2 Parameters below as of 15 Oct 2024 20:28  Patient On (Oxygen Delivery Method): room air                 Input and Output:  I&O's Detail      Lab Data                        12.2   18.75 )-----------( 532      ( 15 Oct 2024 11:30 )             36.0     10-15    139  |  105  |  17  ----------------------------<  161[H]  3.9   |  28  |  0.50    Ca    9.3      15 Oct 2024 11:30  Phos  3.1     10-14  Mg     2.5     10-14    TPro  7.0  /  Alb  2.5[L]  /  TBili  0.3  /  DBili  x   /  AST  40[H]  /  ALT  82[H]  /  AlkPhos  269[H]  10-15            Review of Systems	      Objective     Physical Examination    heart s1s2  lung dc bS  head nc  head at  abd soft      Pertinent Lab findings & Imaging      Stew:  NO   Adequate UO     I&O's Detail           Discussed with:     Cultures:	        Radiology

## 2024-10-16 NOTE — PATIENT CHOICE NOTE. - NSPTCHOICENOTES_GEN_ALL_CORE
Patient agreeable to [facility] for skilled nursing services. Referral sent, awaiting response.

## 2024-10-16 NOTE — PROGRESS NOTE ADULT - SUBJECTIVE AND OBJECTIVE BOX
Patient is a 64y old  Female who presents with a chief complaint of COVID PNA with possible superimposed bacterial pneumonia. (16 Oct 2024 05:15)      Subjective:  INTERVAL HPI/OVERNIGHT EVENTS: Patient seen and examined at bedside. No overnight events occurred. Patient has no complaints at this time states she feels well without NC O2. Denies fevers, chills, headache, lightheadedness, chest pain, dyspnea, abdominal pain, n/v/d/c.    MEDICATIONS  (STANDING):  cefTRIAXone   IVPB 1000 milliGRAM(s) IV Intermittent every 24 hours  dexAMETHasone     Tablet 6 milliGRAM(s) Oral daily  DULoxetine 40 milliGRAM(s) Oral daily  enoxaparin Injectable 40 milliGRAM(s) SubCutaneous every 24 hours  fluticasone propionate/ salmeterol 250-50 MICROgram(s) Diskus 1 Dose(s) Inhalation two times a day  pantoprazole    Tablet 40 milliGRAM(s) Oral before breakfast  remdesivir  IVPB 100 milliGRAM(s) IV Intermittent every 24 hours  remdesivir  IVPB   IV Intermittent     MEDICATIONS  (PRN):  acetaminophen     Tablet .. 650 milliGRAM(s) Oral every 6 hours PRN Temp greater or equal to 38C (100.4F), Mild Pain (1 - 3)  albuterol    90 MICROgram(s) HFA Inhaler 2 Puff(s) Inhalation every 6 hours PRN Bronchospasm  guaifenesin/dextromethorphan Oral Liquid 15 milliLiter(s) Oral every 6 hours PRN Cough  melatonin 3 milliGRAM(s) Oral at bedtime PRN Insomnia      Allergies    penicillin (Hives; Swelling)  Allergic to melons (Rash)  latex (Rash)  adhesives (Rash)  penicillin (Hives)    Intolerances        REVIEW OF SYSTEMS:  CONSTITUTIONAL: No fever or chills  HEENT:  No headache, no sore throat  RESPIRATORY: No cough, wheezing, or shortness of breath  CARDIOVASCULAR: No chest pain, palpitations  GASTROINTESTINAL: No abd pain, nausea, vomiting, or diarrhea  GENITOURINARY: No dysuria, frequency, or hematuria  NEUROLOGICAL: no focal weakness or dizziness  MUSCULOSKELETAL: no myalgias     Objective:  Vital Signs Last 24 Hrs  T(C): 36.5 (16 Oct 2024 05:22), Max: 36.7 (15 Oct 2024 20:28)  T(F): 97.7 (16 Oct 2024 05:22), Max: 98 (15 Oct 2024 20:28)  HR: 62 (16 Oct 2024 05:22) (62 - 80)  BP: 121/77 (16 Oct 2024 05:22) (113/71 - 121/77)  RR: 18 (16 Oct 2024 08:02) (18 - 18)  SpO2: 90% (16 Oct 2024 08:02) (90% - 92%)    Parameters below as of 16 Oct 2024 08:02  Patient On (Oxygen Delivery Method): room air        GENERAL: NAD, lying in bed comfortably, on room air  CHEST/LUNG: decreased breath sounds b/l in lower lung fields  HEART: Regular rate and rhythm; No murmurs, rubs, or gallops  ABDOMEN: Bowel sounds present; Soft, Nontender, Nondistended.  EXTREMITIES: No clubbing, cyanosis, or edema  NERVOUS SYSTEM:  Alert & Oriented X3, speech clear. No deficits   MSK: FROM all 4 extremities, full and equal strength  SKIN: No rashes or lesions    LABS:                        12.0   14.91 )-----------( 547      ( 16 Oct 2024 06:20 )             35.9     CBC Full  -  ( 16 Oct 2024 06:20 )  WBC Count : 14.91 K/uL  Hemoglobin : 12.0 g/dL  Hematocrit : 35.9 %  Platelet Count - Automated : 547 K/uL  Mean Cell Volume : 98.1 fl  Mean Cell Hemoglobin : 32.8 pg  Mean Cell Hemoglobin Concentration : 33.4 gm/dL  Auto Neutrophil # : 10.19 K/uL  Auto Lymphocyte # : 2.54 K/uL  Auto Monocyte # : 1.19 K/uL  Auto Eosinophil # : 0.00 K/uL  Auto Basophil # : 0.08 K/uL  Auto Neutrophil % : 68.4 %  Auto Lymphocyte % : 17.0 %  Auto Monocyte % : 8.0 %  Auto Eosinophil % : 0.0 %  Auto Basophil % : 0.5 %    16 Oct 2024 06:20    140    |  105    |  18     ----------------------------<  139    4.3     |  28     |  0.60     Ca    8.6        16 Oct 2024 06:20  Phos  3.3       16 Oct 2024 06:20  Mg     2.5       16 Oct 2024 06:20    TPro  6.3    /  Alb  2.4    /  TBili  0.3    /  DBili  x      /  AST  28     /  ALT  64     /  AlkPhos  204    16 Oct 2024 06:20      Urinalysis Basic - ( 16 Oct 2024 06:20 )    Color: x / Appearance: x / SG: x / pH: x  Gluc: 139 mg/dL / Ketone: x  / Bili: x / Urobili: x   Blood: x / Protein: x / Nitrite: x   Leuk Esterase: x / RBC: x / WBC x   Sq Epi: x / Non Sq Epi: x / Bacteria: x      CAPILLARY BLOOD GLUCOSE            Culture - Blood (collected 10-13-24 @ 17:30)  Source: .Blood BLOOD  Preliminary Report (10-15-24 @ 22:01):    No growth at 48 Hours    Culture - Blood (collected 10-13-24 @ 17:25)  Source: .Blood BLOOD  Preliminary Report (10-15-24 @ 22:01):    No growth at 48 Hours        RADIOLOGY & ADDITIONAL TESTS:      Personally reviewed.     Consultant(s) Notes Reviewed:  [x] YES  [ ] NO

## 2024-10-16 NOTE — DISCHARGE NOTE NURSING/CASE MANAGEMENT/SOCIAL WORK - PATIENT PORTAL LINK FT
You can access the FollowMyHealth Patient Portal offered by [facility] by registering at the following website: [email or web address]. By joining [employer]’s FollowMyHealth portal, you will also be able to view your health information using other applications (apps) compatible with our system.

## 2024-10-16 NOTE — PROGRESS NOTE ADULT - SUBJECTIVE AND OBJECTIVE BOX
[facility]  INFECTIOUS DISEASES   [address] [place] [address]  Tel: [phone]     Fax: [phone]  ========================================================  MD Luis Barnett Michelle, MD Shah, Kaushal, MD Sunjit, Jaspal, MD Sehrish Shahid, MD   ========================================================    [id number]  MARIA A JIMENES     Follow up: Pneumonia     Feels better, no fever, no new complaint. Not on o2, no SOB.      PAST MEDICAL & SURGICAL HISTORY:  Asthma  HTN (hypertension)  GI bleed  Anxiety  Migraine  History of appendectomy  S/P appendectomy    Social Hx: No current smoking, EtOH or drugs     FAMILY HISTORY:  Noncontributory     Allergies  penicillin (Hives; Swelling)  Allergic to melons (Rash)  latex (Rash)  adhesives (Rash)  penicillin (Hives)    MEDICATIONS  (STANDING):  azithromycin  IVPB 500 milliGRAM(s) IV Intermittent every 24 hours  cefTRIAXone   IVPB 1000 milliGRAM(s) IV Intermittent every 24 hours  dexAMETHasone     Tablet 6 milliGRAM(s) Oral daily  DULoxetine 40 milliGRAM(s) Oral daily  enoxaparin Injectable 40 milliGRAM(s) SubCutaneous every 24 hours  fluticasone propionate/ salmeterol 250-50 MICROgram(s) Diskus 1 Dose(s) Inhalation two times a day  pantoprazole    Tablet 40 milliGRAM(s) Oral before breakfast  remdesivir  IVPB   IV Intermittent     MEDICATIONS  (PRN):  acetaminophen     Tablet .. 650 milliGRAM(s) Oral every 6 hours PRN Temp greater or equal to 38C (100.4F), Mild Pain (1 - 3)  albuterol    90 MICROgram(s) HFA Inhaler 2 Puff(s) Inhalation every 6 hours PRN Bronchospasm  guaifenesin/dextromethorphan Oral Liquid 15 milliLiter(s) Oral every 6 hours PRN Cough  melatonin 3 milliGRAM(s) Oral at bedtime PRN Insomnia     REVIEW OF SYSTEMS:  CONSTITUTIONAL:  No Fever or chills  HEENT:  No diplopia or blurred vision.  No sore throat or runny nose.  CARDIOVASCULAR:  No chest pain   RESPIRATORY:  +cough, +shortness of breath  GASTROINTESTINAL:  No nausea, vomiting or diarrhea.  GENITOURINARY:  No dysuria, frequency or urgency. No Blood in urine  MUSCULOSKELETAL:  no joint aches, no muscle pain  SKIN:  No change in skin, hair or nails.    Physical Exam:  Vital Signs Last 24 Hrs  T(C): 36.5 (16 Oct 2024 05:22), Max: 36.7 (15 Oct 2024 20:28)  T(F): 97.7 (16 Oct 2024 05:22), Max: 98 (15 Oct 2024 20:28)  HR: 62 (16 Oct 2024 05:22) (62 - 80)  BP: 121/77 (16 Oct 2024 05:22) (113/71 - 121/77)  BP(mean): --  RR: 18 (16 Oct 2024 08:05) (18 - 18)  SpO2: 90% (16 Oct 2024 08:05) (90% - 92%)  Parameters below as of 16 Oct 2024 08:05  Patient On (Oxygen Delivery Method): room air  GEN: NAD  HEENT: normocephalic and atraumatic. EOMI. PERRL.    NECK: Supple.  No lymphadenopathy   LUNGS: bibasilar crackles   HEART: Regular rate and rhythm   ABDOMEN: Soft, nontender, and nondistended.    EXTREMITIES: Without edema.  NEUROLOGIC: grossly intact.  PSYCHIATRIC: Appropriate affect .  SKIN: No rash      Labs:                        12.0   14.91 )-----------( 547      ( 16 Oct 2024 06:20 )             35.9     10-16    140  |  105  |  18  ----------------------------<  139[H]  4.3   |  28  |  0.60    Ca    8.6      16 Oct 2024 06:20  Phos  3.3     10-16  Mg     2.5     10-16    TPro  6.3  /  Alb  2.4[L]  /  TBili  0.3  /  DBili  x   /  AST  28  /  ALT  64  /  AlkPhos  204[H]  10-16    Culture - Blood (collected 10-13-24 @ 17:30)  Source: .Blood BLOOD  Preliminary Report (10-15-24 @ 22:01):    No growth at 48 Hours    Culture - Blood (collected 10-13-24 @ 17:25)  Source: .Blood BLOOD  Preliminary Report (10-15-24 @ 22:01):    No growth at 48 Hours    WBC Count: 14.91 K/uL (10-16-24 @ 06:20)  WBC Count: 18.75 K/uL (10-15-24 @ 11:30)  WBC Count: 15.86 K/uL (10-14-24 @ 06:27)  WBC Count: 18.15 K/uL (10-13-24 @ 17:25)    Creatinine: 0.60 mg/dL (10-16-24 @ 06:20)  Creatinine: 0.50 mg/dL (10-15-24 @ 11:30)  Creatinine: 0.58 mg/dL (10-14-24 @ 06:27)  Creatinine: 0.79 mg/dL (10-13-24 @ 17:25)    Procalcitonin: 0.32 ng/mL (10-13-24 @ 17:25)     SARS-CoV-2 Result: Detected (10-13-24 @ 17:25)         All imaging and other data have been reviewed.  < from: CT Angio Chest PE Protocol w/ IV Cont (10.13.24 @ 19:58) >  IMPRESSION:  Bibasilar alveolar opacities suspicious for pneumonia.  No evidence of pulmonary embolus.  1.6 cm aorticopulmonary window node, likely reactive.      Assessment and Plan:   63yo woman with PMH of HTN, Asthma, anxiety, migraine, GI bleed (2019), was admitted with feeling sick with a cold, cough and SOB for the past 4 days.   In ED she had fever and 101.9 and Chest CT showed opacities in btoh lungs suspicious for pneumonia.     # COVID 19   # Bacterial pneumonia, superimposed     - Blood cultures NGTD   - CT with bilateral opacities possibly bacterial pneumonia on top of viral  - Mild leukocytosis due to steroid   - Cans start tapering off Dexamethasone  - O2 sat normal, currently in RA   - Last dose of Remdesivir today (total 3 days)  - Can continue ceftriaxone today   - Switch it to Levaquin 750mg daily to complete 4more days (cQT normal)  - MRSA PCR neg and legionella U ag negative   - Anticoagulation as per protocol    Will sign off please call with any question.     Melchor Jerome MD  Division of Infectious Diseases   Please call ID service at [phone] with any question.      50 minutes spent on total encounter assessing patient, examination, chart review, counseling and coordinating care by the attending physician/nurse/care manager.

## 2024-10-16 NOTE — GOALS OF CARE CONVERSATION - ADVANCED CARE PLANNING - CONVERSATION DETAILS
[facility]-Palliative care SW met with patient at bedside. Reviewed patient's medical and social history as well as events leading to patient's hospitalization. Writer discussed patient's current diagnosis (COVID, PNA, Transaminitis, Asthma, HTN), medical condition and management. Patient states she believes she completed a HCP in the past however she is unsure of where the document is located and would like to update it today. New HCP completed naming son Martinez Canales ([phone]) as primary health care agent and daughter Zaina Al ([phone]) as alternate agent. Encouraged patient to speak with her children about her health care wishes. She showed insight into medical condition. All questions answered. Psychosocial support provided.

## 2024-10-16 NOTE — DISCHARGE NOTE PROVIDER - ATTENDING DISCHARGE PHYSICAL EXAM:
Addended by: NITIN ANDRADE on: 9/15/2024 04:56 PM     Modules accepted: Orders    
Attending Discharge Physical Examination:

## 2024-10-16 NOTE — PROGRESS NOTE ADULT - ASSESSMENT
64-year-old female with a history of asthma, HTN, anxiety, migraine, GI bleed (2019), reporting that she has been sick with a cold and cough for the past 4 days. She had a fever at home last on Friday.    htn  asthma  eval for LRTI - PNA  anxiety  migraines  GI bleed hx    cm follow up  vs noted  abx - remdesivir - decadron - COVID with hx of Asthma and Hypoxemia -   on DC - f/u Dr Marjorie Frazier - [place] - [facility]    remdesivir - decadron for COVID with HYPOXEMIA -   emp ABX for poss LRTI - superimposed -   ct chest imaging reviewed -   ID eval noted  inhaler rx regimen for Asthma hx - pt uses Breo device at home and is requesting a prescription for NEBS on Discharge -   pt follows with Dr Devika Frazier - in the office - [facility] [place]   I truman  monitor VS and HD and Sat  goal sat > 88 pct  CT neg for PE   nutrition - hydration  isol precs  dvt p  
64-year-old female with a history of asthma, HTN, anxiety, migraine, GI bleed (2019), reporting that she has been sick with a cold and cough for the past 4 days. She had a fever at home last on Friday.    htn  asthma  eval for LRTI - PNA  anxiety  migraines  GI bleed hx    remdesivir - decadron for COVID with HYPOXEMIA -   emp ABX for poss LRTI - superimposed -   ct chest imaging reviewed -   ID eval noted  inhaler rx regimen for Asthma hx - pt uses Breo device at home and is requesting a prescription for NEBS on Discharge -   pt follows with Dr Devika Amador - Pulm Med - in the office - [facility] - [place]   I truman  monitor VS and HD and Sat  goal sat > 88 pct  CT neg for PE   nutrition - hydration  isol precs  dvt p  
65yo F w/ PMH of asthma, HTN, anxiety, migraine, GI bleed (2019) presenting with SOB and cough a/w acute hypoxic respiratory failure due to COVID PNA and suspected superimposed bacterial pneumonia.

## 2024-10-16 NOTE — PROGRESS NOTE ADULT - PROBLEM SELECTOR PLAN 3
- hyponatremia and hypokalemia on admission   - normalized  s/p 2L NS bolus and KCl supplement  - TSH is low, will check free t4 and total t3  - Asymptomatic, trend CMP  - Replete prn

## 2024-10-16 NOTE — DISCHARGE NOTE PROVIDER - NSDCCPCAREPLAN_GEN_ALL_CORE_FT
PRINCIPAL DISCHARGE DIAGNOSIS  Diagnosis: Pneumonia  Assessment and Plan of Treatment: Pneumonia is an infection of the lungs. Pneumonia may be caused by bacteria, viruses, or funguses. Symptoms include coughing, fever, chest pain when breathing deeply or coughing, shortness of breath, fatigue, or muscle aches. Pneumonia can be diagnosed with a medical history and physical exam, as well as other tests which may include a chest X-ray. If you were prescribed an antibiotic medicine, take it as told by your health care provider and do not stop taking the antibiotic even if you start to feel better. Do not use tobacco products, including cigarettes, chewing tobacco, and e-cigarettes.  You were treated using antibiotics inpatient and should continue to take antibiotics at home.  Please Continue:  SEEK IMMEDIATE MEDICAL CARE IF YOU HAVE ANY OF THE FOLLOWING SYMPTOMS: worsening shortness of breath, worsening chest pain, coughing up blood, change in mental status, lightheadedness/dizziness.   Please follow up with your PCP 1-2 weeks after discharge from the hospital.      SECONDARY DISCHARGE DIAGNOSES  Diagnosis: COVID  Assessment and Plan of Treatment: You were found to test positive for COVID-19. Although you are not having symptoms now, it is important for you to monitor yourself for fever, shortness of breath, difficulty breathing, chest pain. If any of these symptoms occur please seek medical attention immediately.   You were treated with remdesevir for 3 days while inpatient and treated with steroids to help with your breathing.  You should follow up with your PCP afterwards for continued care.    Diagnosis: COPD with hypoxia  Assessment and Plan of Treatment:      PRINCIPAL DISCHARGE DIAGNOSIS  Diagnosis: Pneumonia  Assessment and Plan of Treatment: Pneumonia is an infection of the lungs. Pneumonia may be caused by bacteria, viruses, or funguses. Symptoms include coughing, fever, chest pain when breathing deeply or coughing, shortness of breath, fatigue, or muscle aches. Pneumonia can be diagnosed with a medical history and physical exam, as well as other tests which may include a chest X-ray. If you were prescribed an antibiotic medicine, take it as told by your health care provider and do not stop taking the antibiotic even if you start to feel better. Do not use tobacco products, including cigarettes, chewing tobacco, and e-cigarettes.  You were treated using antibiotics inpatient and should continue to take antibiotics at home. TAKE LEVAQUIN 750mg x 4 days. START TOMORROW.  Please Continue:  SEEK IMMEDIATE MEDICAL CARE IF YOU HAVE ANY OF THE FOLLOWING SYMPTOMS: worsening shortness of breath, worsening chest pain, coughing up blood, change in mental status, lightheadedness/dizziness.   Please follow up with your PCP 1-2 weeks after discharge from the hospital.  Follow up with Dr Marjorie [facility]      SECONDARY DISCHARGE DIAGNOSES  Diagnosis: COVID  Assessment and Plan of Treatment: You were found to test positive for COVID-19. Although you are not having symptoms now, it is important for you to monitor yourself for fever, shortness of breath, difficulty breathing, chest pain. If any of these symptoms occur please seek medical attention immediately.   You were treated with remdesevir for 3 days while inpatient and treated with steroids to help with your breathing.  You should follow up with your PCP afterwards for continued care.    Diagnosis: COPD with hypoxia  Assessment and Plan of Treatment:

## 2024-10-16 NOTE — PROGRESS NOTE ADULT - PROBLEM SELECTOR PLAN 2
- Pt with alb 2.7, alk phos 304, ast 94, alt 108 on admission  - Likely related to COVID infection or due to outpt Wegovy that pt states she had been on and her outpt physician decreased after seeing some "liver/GB issue"  - LFTs improving  - RUQ ultrasound without sign of CBD obstruction or acute pathology - pt does have cholelithiasis   - monitor
- Pt with alb 2.7, alk phos 304, ast 94, alt 108 on admission  - Likely related to COVID infection  - LFTs improving  - RUQ ultrasound without sign of CBD obstruction or acute pathology  - monitor
- Pt with alb 2.7, alk phos 304, ast 94, alt 108 on admission  - Likely related to COVID infection or due to outpt Wegovy that pt states she had been on and her outpt physician decreased after seeing some "liver/GB issue"  - LFTs improving  - RUQ ultrasound without sign of CBD obstruction or acute pathology - pt does have cholelithiasis   - monitor

## 2024-10-16 NOTE — CASE MANAGEMENT PROGRESS NOTE - NSCMPROGRESSNOTE_GEN_ALL_CORE
Per MD, patient is medically cleared for discharge home today.  CM met with patient to discuss discharge disposition to home with [facility] [phone] for skilled nursing services. Discharge notice/IMM signed and copy given to patient.  Family to transport patient home. Patient verbalized understanding of the transition plan and is in agreement. CM answered all questions to the best of my abilities. CM remains available throughout hospital stay.

## 2024-10-16 NOTE — PROGRESS NOTE ADULT - PROBLEM SELECTOR PLAN 4
- Chronic  - hold amlodipine as BP in low end of normal range  - monitor VS

## 2024-10-16 NOTE — DISCHARGE NOTE NURSING/CASE MANAGEMENT/SOCIAL WORK - NSDCPEFALRISK_GEN_ALL_CORE
For information on Fall & Injury Prevention, visit: [email or web address] OR  [email or web address] OR  https://www.cdc.gov/steadi/patient.html 1

## 2024-10-16 NOTE — DISCHARGE NOTE PROVIDER - ATTENDING DISCHARGE PHYSICAL EXAMINATION:
gen: NAD  neuro: AO3 grossly moves all extremities  lungs: diminished at baseline CTA bl no wheezing

## 2024-10-16 NOTE — PROGRESS NOTE ADULT - PROBLEM SELECTOR PLAN 1
- acute hypoxic respiratory failure due to COVID pneumonia with suspected superimposed bacterial pneumonia  - CTA: Bibasilar alveolar opacities suspicious for pneumonia. No evidence of pulmonary embolus. 1.6 cm aorticopulmonary window node, likely reactive.  - F/u legionella Ag, strep pneumo Ag, MRSA/MSSA nares PCR  - started on Remdesivir 10/14 -- ID may limit to 3-day course if pt continues to rapidly improve  - c/w decadron for now due to hypoxia+COVID on admission -- duration per ID/pulm  - Continue ceftriaxone + azithromycin   - pantoprazole for GI ppx given pt on steroids  - Robitussin q6 prn for cough  - c/w Advair  - c/w Albuterol PRN  - Continuous pulse ox  - hypoxia resolving -- tapered off NC O2 on 10/15 and saturating in low-90s on RA at rest   - check ambulatory RA O2 sats  - On d/c will stop decadron and continue azithro and remdesivir  - ID consulted, Dr. Jerome, recs appreciated  - pulm (Dr. Lynn), recs appreciated - acute hypoxic respiratory failure due to COVID pneumonia with suspected superimposed bacterial pneumonia  - CTA: Bibasilar alveolar opacities suspicious for pneumonia. No evidence of pulmonary embolus. 1.6 cm aorticopulmonary window node, likely reactive.  - F/u legionella Ag, strep pneumo Ag, MRSA/MSSA nares PCR  - started on Remdesivir 10/14 -- ID may limit to 3-day course if pt continues to rapidly improve  - c/w decadron for now due to hypoxia+COVID on admission -- duration per ID/pulm  - Continue ceftriaxone + azithromycin   - pantoprazole for GI ppx given pt on steroids  - Robitussin q6 prn for cough  - c/w Advair  - c/w Albuterol PRN  - Continuous pulse ox  - hypoxia resolving -- tapered off NC O2 on 10/15 and saturating in low-90s on RA at rest   - check ambulatory RA O2 sats  - On d/c will stop decadron and remdesivir, switch to levaquin outpt  - ID consulted, Dr. Jerome, recs appreciated  - pulm (Dr. Lynn), recs appreciated

## 2024-10-16 NOTE — PROGRESS NOTE ADULT - REASON FOR ADMISSION
COVID PNA with possible superimposed bacterial pneumonia.
COVID PNA with suspected superimposed bacterial pneumonia.
COVID PNA with suspected superimposed bacterial pneumonia.

## 2024-10-18 LAB
CULTURE RESULTS: SIGNIFICANT CHANGE UP
CULTURE RESULTS: SIGNIFICANT CHANGE UP
SPECIMEN SOURCE: SIGNIFICANT CHANGE UP
SPECIMEN SOURCE: SIGNIFICANT CHANGE UP

## 2024-11-01 ENCOUNTER — APPOINTMENT (OUTPATIENT)
Dept: PULMONOLOGY | Facility: CLINIC | Age: 64
End: 2024-11-01
Payer: MEDICARE

## 2024-11-01 VITALS
OXYGEN SATURATION: 97 % | HEART RATE: 101 BPM | DIASTOLIC BLOOD PRESSURE: 76 MMHG | SYSTOLIC BLOOD PRESSURE: 115 MMHG | BODY MASS INDEX: 23.27 KG/M2 | HEIGHT: 67.5 IN | WEIGHT: 150 LBS

## 2024-11-01 DIAGNOSIS — J18.9 PNEUMONIA, UNSPECIFIED ORGANISM: ICD-10-CM

## 2024-11-01 DIAGNOSIS — Z23 ENCOUNTER FOR IMMUNIZATION: ICD-10-CM

## 2024-11-01 PROCEDURE — G0008: CPT

## 2024-11-01 PROCEDURE — 99214 OFFICE O/P EST MOD 30 MIN: CPT

## 2024-11-01 PROCEDURE — 90662 IIV NO PRSV INCREASED AG IM: CPT

## 2024-11-01 RX ORDER — ALBUTEROL SULFATE 2.5 MG/3ML
(2.5 MG/3ML) SOLUTION RESPIRATORY (INHALATION)
Qty: 1 | Refills: 2 | Status: ACTIVE | COMMUNITY
Start: 2024-11-01 | End: 1900-01-01

## 2024-11-26 PROCEDURE — 80061 LIPID PANEL: CPT

## 2024-11-26 PROCEDURE — 71045 X-RAY EXAM CHEST 1 VIEW: CPT

## 2024-11-26 PROCEDURE — 85610 PROTHROMBIN TIME: CPT

## 2024-11-26 PROCEDURE — 84480 ASSAY TRIIODOTHYRONINE (T3): CPT

## 2024-11-26 PROCEDURE — 96365 THER/PROPH/DIAG IV INF INIT: CPT

## 2024-11-26 PROCEDURE — 84436 ASSAY OF TOTAL THYROXINE: CPT

## 2024-11-26 PROCEDURE — 80053 COMPREHEN METABOLIC PANEL: CPT

## 2024-11-26 PROCEDURE — 84156 ASSAY OF PROTEIN URINE: CPT

## 2024-11-26 PROCEDURE — 84443 ASSAY THYROID STIM HORMONE: CPT

## 2024-11-26 PROCEDURE — 87637 SARSCOV2&INF A&B&RSV AMP PRB: CPT

## 2024-11-26 PROCEDURE — 84439 ASSAY OF FREE THYROXINE: CPT

## 2024-11-26 PROCEDURE — 83735 ASSAY OF MAGNESIUM: CPT

## 2024-11-26 PROCEDURE — 87641 MR-STAPH DNA AMP PROBE: CPT

## 2024-11-26 PROCEDURE — 82570 ASSAY OF URINE CREATININE: CPT

## 2024-11-26 PROCEDURE — 83036 HEMOGLOBIN GLYCOSYLATED A1C: CPT

## 2024-11-26 PROCEDURE — 85025 COMPLETE CBC W/AUTO DIFF WBC: CPT

## 2024-11-26 PROCEDURE — 36415 COLL VENOUS BLD VENIPUNCTURE: CPT

## 2024-11-26 PROCEDURE — 84550 ASSAY OF BLOOD/URIC ACID: CPT

## 2024-11-26 PROCEDURE — 85379 FIBRIN DEGRADATION QUANT: CPT

## 2024-11-26 PROCEDURE — 81001 URINALYSIS AUTO W/SCOPE: CPT

## 2024-11-26 PROCEDURE — 84300 ASSAY OF URINE SODIUM: CPT

## 2024-11-26 PROCEDURE — 83935 ASSAY OF URINE OSMOLALITY: CPT

## 2024-11-26 PROCEDURE — 84145 PROCALCITONIN (PCT): CPT

## 2024-11-26 PROCEDURE — 83605 ASSAY OF LACTIC ACID: CPT

## 2024-11-26 PROCEDURE — 87449 NOS EACH ORGANISM AG IA: CPT

## 2024-11-26 PROCEDURE — 94640 AIRWAY INHALATION TREATMENT: CPT

## 2024-11-26 PROCEDURE — 84540 ASSAY OF URINE/UREA-N: CPT

## 2024-11-26 PROCEDURE — 84100 ASSAY OF PHOSPHORUS: CPT

## 2024-11-26 PROCEDURE — 87640 STAPH A DNA AMP PROBE: CPT

## 2024-11-26 PROCEDURE — 84133 ASSAY OF URINE POTASSIUM: CPT

## 2024-11-26 PROCEDURE — 99285 EMERGENCY DEPT VISIT HI MDM: CPT

## 2024-11-26 PROCEDURE — 76705 ECHO EXAM OF ABDOMEN: CPT

## 2024-11-26 PROCEDURE — 87899 AGENT NOS ASSAY W/OPTIC: CPT

## 2024-11-26 PROCEDURE — 87040 BLOOD CULTURE FOR BACTERIA: CPT

## 2024-11-26 PROCEDURE — 93005 ELECTROCARDIOGRAM TRACING: CPT

## 2024-11-26 PROCEDURE — 96375 TX/PRO/DX INJ NEW DRUG ADDON: CPT

## 2024-11-26 PROCEDURE — 71275 CT ANGIOGRAPHY CHEST: CPT | Mod: MC

## 2024-11-26 PROCEDURE — 85730 THROMBOPLASTIN TIME PARTIAL: CPT

## 2024-12-30 ENCOUNTER — APPOINTMENT (OUTPATIENT)
Dept: CT IMAGING | Facility: CLINIC | Age: 64
End: 2024-12-30
Payer: MEDICARE

## 2024-12-30 PROCEDURE — 71250 CT THORAX DX C-: CPT

## 2025-01-17 ENCOUNTER — APPOINTMENT (OUTPATIENT)
Dept: PULMONOLOGY | Facility: CLINIC | Age: 65
End: 2025-01-17
Payer: MEDICARE

## 2025-01-17 VITALS
RESPIRATION RATE: 16 BRPM | DIASTOLIC BLOOD PRESSURE: 74 MMHG | OXYGEN SATURATION: 100 % | HEIGHT: 67 IN | HEART RATE: 96 BPM | BODY MASS INDEX: 23.78 KG/M2 | WEIGHT: 151.5 LBS | TEMPERATURE: 97.9 F | SYSTOLIC BLOOD PRESSURE: 110 MMHG

## 2025-01-17 DIAGNOSIS — J45.909 UNSPECIFIED ASTHMA, UNCOMPLICATED: ICD-10-CM

## 2025-01-17 PROCEDURE — 94010 BREATHING CAPACITY TEST: CPT

## 2025-01-17 PROCEDURE — 99214 OFFICE O/P EST MOD 30 MIN: CPT | Mod: 25

## 2025-01-17 RX ORDER — LEVOCETIRIZINE DIHYDROCHLORIDE 5 MG/1
5 TABLET ORAL
Qty: 30 | Refills: 2 | Status: ACTIVE | COMMUNITY
Start: 2025-01-17 | End: 1900-01-01

## 2025-04-06 ENCOUNTER — NON-APPOINTMENT (OUTPATIENT)
Age: 65
End: 2025-04-06

## 2025-05-13 ENCOUNTER — APPOINTMENT (OUTPATIENT)
Dept: PULMONOLOGY | Facility: CLINIC | Age: 65
End: 2025-05-13
Payer: MEDICARE

## 2025-05-13 ENCOUNTER — APPOINTMENT (OUTPATIENT)
Dept: PULMONOLOGY | Facility: CLINIC | Age: 65
End: 2025-05-13

## 2025-05-13 VITALS
DIASTOLIC BLOOD PRESSURE: 73 MMHG | OXYGEN SATURATION: 96 % | HEART RATE: 78 BPM | BODY MASS INDEX: 23.39 KG/M2 | HEIGHT: 67 IN | SYSTOLIC BLOOD PRESSURE: 119 MMHG | WEIGHT: 149 LBS

## 2025-05-13 DIAGNOSIS — J18.9 PNEUMONIA, UNSPECIFIED ORGANISM: ICD-10-CM

## 2025-05-13 DIAGNOSIS — J45.909 UNSPECIFIED ASTHMA, UNCOMPLICATED: ICD-10-CM

## 2025-05-13 PROCEDURE — 99214 OFFICE O/P EST MOD 30 MIN: CPT

## 2025-05-13 PROCEDURE — G2211 COMPLEX E/M VISIT ADD ON: CPT

## 2025-05-13 RX ORDER — METOPROLOL TARTRATE 75 MG/1
TABLET, FILM COATED ORAL
Refills: 0 | Status: ACTIVE | COMMUNITY

## 2025-06-24 ENCOUNTER — APPOINTMENT (OUTPATIENT)
Dept: CT IMAGING | Facility: CLINIC | Age: 65
End: 2025-06-24
Payer: MEDICARE

## 2025-06-24 PROCEDURE — 71250 CT THORAX DX C-: CPT

## 2025-07-16 ENCOUNTER — APPOINTMENT (OUTPATIENT)
Dept: ULTRASOUND IMAGING | Facility: CLINIC | Age: 65
End: 2025-07-16
Payer: MEDICARE

## 2025-07-16 PROCEDURE — 76700 US EXAM ABDOM COMPLETE: CPT

## 2025-08-21 ENCOUNTER — NON-APPOINTMENT (OUTPATIENT)
Age: 65
End: 2025-08-21

## 2025-09-16 ENCOUNTER — RX RENEWAL (OUTPATIENT)
Age: 65
End: 2025-09-16

## (undated) DEVICE — SUCTION YANKAUER NO CONTROL VENT

## (undated) DEVICE — BITE BLOCK ADULT 20 X 27MM (GREEN)

## (undated) DEVICE — BALLOON US ENDO

## (undated) DEVICE — SYR ALLIANCE II INFLATION 60ML

## (undated) DEVICE — FOLEY HOLDER STATLOCK 2 WAY ADULT

## (undated) DEVICE — CATH IV SAFE BC 22G X 1" (BLUE)

## (undated) DEVICE — SOL INJ NS 0.9% 500ML 2 PORT

## (undated) DEVICE — TUBING SUCTION CONN 6FT STERILE

## (undated) DEVICE — TUBING IV SET GRAVITY 3Y 100" MACRO

## (undated) DEVICE — TUBING SUCTION 20FT

## (undated) DEVICE — SENSOR O2 FINGER ADULT

## (undated) DEVICE — CATH IV SAFE BC 20G X 1.16" (PINK)

## (undated) DEVICE — BIOPSY FORCEP RADIAL JAW 4 STANDARD WITH NEEDLE

## (undated) DEVICE — PACK IV START WITH CHG